# Patient Record
Sex: FEMALE | Race: BLACK OR AFRICAN AMERICAN | Employment: FULL TIME | URBAN - NONMETROPOLITAN AREA
[De-identification: names, ages, dates, MRNs, and addresses within clinical notes are randomized per-mention and may not be internally consistent; named-entity substitution may affect disease eponyms.]

---

## 2018-07-07 ENCOUNTER — HOSPITAL ENCOUNTER (EMERGENCY)
Age: 38
Discharge: HOME | End: 2018-07-07
Payer: COMMERCIAL

## 2018-07-07 VITALS
SYSTOLIC BLOOD PRESSURE: 125 MMHG | HEIGHT: 68 IN | OXYGEN SATURATION: 97 % | DIASTOLIC BLOOD PRESSURE: 73 MMHG | WEIGHT: 242 LBS | HEART RATE: 89 BPM | RESPIRATION RATE: 16 BRPM | BODY MASS INDEX: 36.68 KG/M2 | TEMPERATURE: 97.9 F

## 2018-07-07 DIAGNOSIS — H10.31 ACUTE BACTERIAL CONJUNCTIVITIS OF RIGHT EYE: Primary | ICD-10-CM

## 2018-07-07 PROCEDURE — 99203 OFFICE O/P NEW LOW 30 MIN: CPT | Performed by: NURSE PRACTITIONER

## 2018-07-07 PROCEDURE — 99212 OFFICE O/P EST SF 10 MIN: CPT

## 2018-07-07 RX ORDER — TOBRAMYCIN 3 MG/ML
1 SOLUTION/ DROPS OPHTHALMIC EVERY 6 HOURS
Qty: 1 BOTTLE | Refills: 0 | Status: SHIPPED | OUTPATIENT
Start: 2018-07-07 | End: 2018-07-14

## 2018-07-07 ASSESSMENT — VISUAL ACUITY
OD: 20/30
OS: 20/25

## 2018-07-07 ASSESSMENT — PAIN DESCRIPTION - DESCRIPTORS: DESCRIPTORS: DISCOMFORT

## 2018-07-07 ASSESSMENT — PAIN DESCRIPTION - LOCATION: LOCATION: EYE

## 2018-07-07 ASSESSMENT — PAIN DESCRIPTION - PAIN TYPE: TYPE: ACUTE PAIN

## 2018-07-07 ASSESSMENT — PAIN SCALES - WONG BAKER: WONGBAKER_NUMERICALRESPONSE: 2

## 2018-07-07 NOTE — ED PROVIDER NOTES
ligation. CURRENT MEDICATIONS       Discharge Medication List as of 7/7/2018 10:38 AM      CONTINUE these medications which have NOT CHANGED    Details   diazepam (VALIUM) 5 MG tablet Take 1 tablet by mouth every 6 hours as needed for Anxiety, Disp-10 tablet, R-0             ALLERGIES     Patient is has No Known Allergies. FAMILY HISTORY     Patient's family history includes Diabetes in her mother; Heart Disease in her mother; High Blood Pressure in her mother; High Cholesterol in her mother; Stroke in her mother. SOCIAL HISTORY     Patient  reports that she has been smoking. She has been smoking about 0.50 packs per day. She has never used smokeless tobacco. She reports that she does not drink alcohol or use drugs. PHYSICAL EXAM     ED TRIAGE VITALS  BP: 125/73, Temp: 97.9 °F (36.6 °C), Pulse: 89, Resp: 16, SpO2: 97 %  Physical Exam   Constitutional: She is oriented to person, place, and time. Vital signs are normal. She appears well-developed and well-nourished. Non-toxic appearance. She does not have a sickly appearance. No distress. HENT:   Head: Normocephalic and atraumatic. Head is without right periorbital erythema. Right Ear: Hearing, external ear and ear canal normal.   Left Ear: Hearing and external ear normal.   Nose: Nose normal.   Mouth/Throat: Uvula is midline, oropharynx is clear and moist and mucous membranes are normal.   Eyes: EOM are normal. Pupils are equal, round, and reactive to light. Right eye exhibits discharge. Right eye exhibits no chemosis and no hordeolum. No foreign body present in the right eye. Left eye exhibits no discharge. Right conjunctiva is injected. Right conjunctiva has no hemorrhage. No scleral icterus. Right pupil is round and reactive. Left pupil is round and reactive. Pupils are equal.   Fundoscopic exam:       The right eye shows exudate (yellow/green mucous). The right eye shows no hemorrhage and no papilledema.    Slit lamp exam:       The right eye Place 1 drop into the right eye every 6 hours for 7 days, Disp-1 Bottle, R-0Normal           Discharge Medication List as of 7/7/2018 10:38 AM          Patient given educational materials - see patient instructions. Discussed use, benefit, and side effects of prescribed medications. All patient questions answered. Pt voiced understanding. Reviewed health maintenance. Patient agreed with treatment plan. Follow up as directed.      SONNY Gomes CNP, APRN - CNP  07/09/18 6803

## 2018-07-09 ASSESSMENT — ENCOUNTER SYMPTOMS
COUGH: 0
EYE WATERING: 1
EYE PAIN: 1
RHINORRHEA: 0
EYE ITCHING: 0
BLURRED VISION: 0
EYE INFLAMMATION: 1
VOMITING: 0
FACIAL SWELLING: 1
EYE DISCHARGE: 1
PHOTOPHOBIA: 0
CRUSTING: 1
NAUSEA: 0
DOUBLE VISION: 0
EYE REDNESS: 1
BLIND SPOTS: 0

## 2019-02-12 ENCOUNTER — HOSPITAL ENCOUNTER (EMERGENCY)
Age: 39
Discharge: HOME | End: 2019-02-12
Payer: COMMERCIAL

## 2019-02-12 VITALS
SYSTOLIC BLOOD PRESSURE: 126 MMHG | DIASTOLIC BLOOD PRESSURE: 78 MMHG | HEART RATE: 97 BPM | RESPIRATION RATE: 16 BRPM | HEIGHT: 68 IN | OXYGEN SATURATION: 98 % | BODY MASS INDEX: 38.19 KG/M2 | WEIGHT: 252 LBS | TEMPERATURE: 97.8 F

## 2019-02-12 DIAGNOSIS — J30.2 SEASONAL ALLERGIC RHINITIS, UNSPECIFIED TRIGGER: ICD-10-CM

## 2019-02-12 DIAGNOSIS — H60.392 INFECTIVE OTITIS EXTERNA OF LEFT EAR: Primary | ICD-10-CM

## 2019-02-12 PROCEDURE — 99214 OFFICE O/P EST MOD 30 MIN: CPT | Performed by: NURSE PRACTITIONER

## 2019-02-12 PROCEDURE — 99212 OFFICE O/P EST SF 10 MIN: CPT

## 2019-02-12 RX ORDER — OFLOXACIN 3 MG/ML
10 SOLUTION AURICULAR (OTIC) 2 TIMES DAILY
Qty: 1 BOTTLE | Refills: 0 | Status: SHIPPED | OUTPATIENT
Start: 2019-02-12 | End: 2019-02-22

## 2019-02-12 RX ORDER — IBUPROFEN 600 MG/1
600 TABLET ORAL EVERY 6 HOURS PRN
COMMUNITY
End: 2021-01-04 | Stop reason: ALTCHOICE

## 2019-02-12 RX ORDER — LORATADINE AND PSEUDOEPHEDRINE 10; 240 MG/1; MG/1
1 TABLET, EXTENDED RELEASE ORAL DAILY
Qty: 30 TABLET | Refills: 0 | Status: SHIPPED | OUTPATIENT
Start: 2019-02-12 | End: 2019-09-23

## 2019-02-12 ASSESSMENT — PAIN DESCRIPTION - DESCRIPTORS: DESCRIPTORS: ACHING

## 2019-02-12 ASSESSMENT — PAIN SCALES - GENERAL: PAINLEVEL_OUTOF10: 7

## 2019-02-12 ASSESSMENT — ENCOUNTER SYMPTOMS
SINUS PRESSURE: 0
STRIDOR: 0
WHEEZING: 0
RHINORRHEA: 0
EYE REDNESS: 0
SHORTNESS OF BREATH: 0
TROUBLE SWALLOWING: 0
EYE ITCHING: 0
CHEST TIGHTNESS: 0
SORE THROAT: 0
COUGH: 0
EYE DISCHARGE: 0
VOICE CHANGE: 0

## 2019-02-12 ASSESSMENT — PAIN DESCRIPTION - PAIN TYPE: TYPE: ACUTE PAIN

## 2019-02-12 ASSESSMENT — PAIN DESCRIPTION - FREQUENCY: FREQUENCY: CONTINUOUS

## 2019-02-12 ASSESSMENT — PAIN DESCRIPTION - LOCATION: LOCATION: EAR

## 2019-02-12 ASSESSMENT — PAIN DESCRIPTION - ORIENTATION: ORIENTATION: LEFT

## 2019-07-12 ENCOUNTER — HOSPITAL ENCOUNTER (EMERGENCY)
Age: 39
Discharge: HOME | End: 2019-07-12
Payer: COMMERCIAL

## 2019-07-12 VITALS
HEIGHT: 68 IN | BODY MASS INDEX: 31.83 KG/M2 | RESPIRATION RATE: 16 BRPM | TEMPERATURE: 98.8 F | SYSTOLIC BLOOD PRESSURE: 118 MMHG | DIASTOLIC BLOOD PRESSURE: 78 MMHG | OXYGEN SATURATION: 98 % | WEIGHT: 210 LBS | HEART RATE: 90 BPM

## 2019-07-12 DIAGNOSIS — H60.393 INFECTIVE OTITIS EXTERNA OF BOTH EARS: Primary | ICD-10-CM

## 2019-07-12 DIAGNOSIS — H66.003 ACUTE SUPPURATIVE OTITIS MEDIA OF BOTH EARS WITHOUT SPONTANEOUS RUPTURE OF TYMPANIC MEMBRANES, RECURRENCE NOT SPECIFIED: ICD-10-CM

## 2019-07-12 PROCEDURE — 99212 OFFICE O/P EST SF 10 MIN: CPT

## 2019-07-12 PROCEDURE — 99214 OFFICE O/P EST MOD 30 MIN: CPT | Performed by: NURSE PRACTITIONER

## 2019-07-12 RX ORDER — NEOMYCIN SULFATE, POLYMYXIN B SULFATE, HYDROCORTISONE 3.5; 10000; 1 MG/ML; [USP'U]/ML; MG/ML
4 SOLUTION/ DROPS AURICULAR (OTIC) 3 TIMES DAILY
Qty: 10 ML | Refills: 0 | Status: SHIPPED | OUTPATIENT
Start: 2019-07-12 | End: 2019-07-19

## 2019-07-12 RX ORDER — AMOXICILLIN 875 MG/1
875 TABLET, COATED ORAL 2 TIMES DAILY
Qty: 20 TABLET | Refills: 0 | Status: SHIPPED | OUTPATIENT
Start: 2019-07-12 | End: 2019-07-22

## 2019-07-12 RX ORDER — OMEPRAZOLE 10 MG/1
10 CAPSULE, DELAYED RELEASE ORAL DAILY
COMMUNITY
End: 2019-09-25

## 2019-07-12 ASSESSMENT — PAIN - FUNCTIONAL ASSESSMENT: PAIN_FUNCTIONAL_ASSESSMENT: ACTIVITIES ARE NOT PREVENTED

## 2019-07-12 ASSESSMENT — PAIN DESCRIPTION - ONSET: ONSET: ON-GOING

## 2019-07-12 ASSESSMENT — PAIN DESCRIPTION - DESCRIPTORS: DESCRIPTORS: ACHING;THROBBING

## 2019-07-12 ASSESSMENT — ENCOUNTER SYMPTOMS
NAUSEA: 0
SHORTNESS OF BREATH: 0
SORE THROAT: 0
SINUS PRESSURE: 0
VOMITING: 0
COUGH: 0

## 2019-07-12 ASSESSMENT — PAIN DESCRIPTION - ORIENTATION: ORIENTATION: RIGHT;LEFT

## 2019-07-12 ASSESSMENT — PAIN DESCRIPTION - FREQUENCY: FREQUENCY: CONTINUOUS

## 2019-07-12 ASSESSMENT — PAIN DESCRIPTION - PROGRESSION: CLINICAL_PROGRESSION: GRADUALLY WORSENING

## 2019-07-12 ASSESSMENT — PAIN SCALES - GENERAL: PAINLEVEL_OUTOF10: 6

## 2019-07-12 ASSESSMENT — PAIN DESCRIPTION - PAIN TYPE: TYPE: ACUTE PAIN

## 2019-07-12 NOTE — ED PROVIDER NOTES
neomycin-polymyxin-hydrocortisone 1 % SOLN otic solution Place 4 drops into both ears three times daily for 7 days, Disp-10 mL, R-0Normal      amoxicillin (AMOXIL) 875 MG tablet Take 1 tablet by mouth 2 times daily for 10 days, Disp-20 tablet, R-0Normal             Discharge Medication List as of 7/12/2019  9:08 AM      STOP taking these medications       diazepam (VALIUM) 5 MG tablet Comments:   Reason for Stopping:               Discharge Medication List as of 7/12/2019  9:08 AM          SONNY Garrido - CNP    (Please note that portions of this note were completed with a voice recognition program. Efforts were made to edit the dictations but occasionally words are mis-transcribed.)         SONNY Garrido CNP  07/12/19 9936

## 2019-09-23 ENCOUNTER — HOSPITAL ENCOUNTER (EMERGENCY)
Age: 39
Discharge: HOME | End: 2019-09-23
Payer: COMMERCIAL

## 2019-09-23 VITALS
TEMPERATURE: 98.1 F | HEIGHT: 68 IN | SYSTOLIC BLOOD PRESSURE: 127 MMHG | DIASTOLIC BLOOD PRESSURE: 63 MMHG | HEART RATE: 99 BPM | WEIGHT: 218 LBS | RESPIRATION RATE: 20 BRPM | OXYGEN SATURATION: 98 % | BODY MASS INDEX: 33.04 KG/M2

## 2019-09-23 DIAGNOSIS — H60.393 INFECTIVE OTITIS EXTERNA OF BOTH EARS: Primary | ICD-10-CM

## 2019-09-23 PROCEDURE — 99214 OFFICE O/P EST MOD 30 MIN: CPT | Performed by: NURSE PRACTITIONER

## 2019-09-23 PROCEDURE — 99212 OFFICE O/P EST SF 10 MIN: CPT

## 2019-09-23 RX ORDER — CEFDINIR 300 MG/1
300 CAPSULE ORAL 2 TIMES DAILY
Qty: 20 CAPSULE | Refills: 0 | Status: SHIPPED | OUTPATIENT
Start: 2019-09-23 | End: 2019-10-03

## 2019-09-23 RX ORDER — OFLOXACIN 3 MG/ML
10 SOLUTION AURICULAR (OTIC) 2 TIMES DAILY
Qty: 10 ML | Refills: 0 | Status: SHIPPED | OUTPATIENT
Start: 2019-09-23 | End: 2019-10-03

## 2019-09-23 ASSESSMENT — PAIN DESCRIPTION - DESCRIPTORS: DESCRIPTORS: ACHING

## 2019-09-23 ASSESSMENT — PAIN DESCRIPTION - LOCATION: LOCATION: EAR

## 2019-09-23 ASSESSMENT — PAIN SCALES - GENERAL: PAINLEVEL_OUTOF10: 5

## 2019-09-23 ASSESSMENT — ENCOUNTER SYMPTOMS
SORE THROAT: 0
SHORTNESS OF BREATH: 0
COUGH: 0
NAUSEA: 0
VOMITING: 0
EYE ITCHING: 0
EYE REDNESS: 0

## 2019-09-23 ASSESSMENT — PAIN DESCRIPTION - PROGRESSION: CLINICAL_PROGRESSION: GRADUALLY WORSENING

## 2019-09-23 ASSESSMENT — PAIN - FUNCTIONAL ASSESSMENT: PAIN_FUNCTIONAL_ASSESSMENT: ACTIVITIES ARE NOT PREVENTED

## 2019-09-23 ASSESSMENT — PAIN DESCRIPTION - PAIN TYPE: TYPE: ACUTE PAIN

## 2019-09-23 ASSESSMENT — PAIN DESCRIPTION - ONSET: ONSET: GRADUAL

## 2019-09-23 ASSESSMENT — PAIN DESCRIPTION - ORIENTATION: ORIENTATION: RIGHT;LEFT

## 2019-09-23 ASSESSMENT — PAIN DESCRIPTION - FREQUENCY: FREQUENCY: CONTINUOUS

## 2019-09-23 NOTE — ED NOTES
All discharge education and information given. Pt instructed to go to ED for any increase in ear pain. Verbalized Understanding. Left stable.      Marylene Else, RN  09/23/19 3802

## 2019-09-25 ENCOUNTER — OFFICE VISIT (OUTPATIENT)
Dept: FAMILY MEDICINE CLINIC | Age: 39
End: 2019-09-25
Payer: COMMERCIAL

## 2019-09-25 VITALS
HEIGHT: 67 IN | TEMPERATURE: 98.6 F | OXYGEN SATURATION: 99 % | SYSTOLIC BLOOD PRESSURE: 116 MMHG | BODY MASS INDEX: 39.9 KG/M2 | HEART RATE: 87 BPM | DIASTOLIC BLOOD PRESSURE: 76 MMHG | WEIGHT: 254.2 LBS | RESPIRATION RATE: 12 BRPM

## 2019-09-25 DIAGNOSIS — R13.10 DYSPHAGIA, UNSPECIFIED TYPE: ICD-10-CM

## 2019-09-25 DIAGNOSIS — E55.9 VITAMIN D DEFICIENCY: ICD-10-CM

## 2019-09-25 DIAGNOSIS — Z01.89 ENCOUNTER FOR ROUTINE LABORATORY TESTING: ICD-10-CM

## 2019-09-25 DIAGNOSIS — K90.89 OTHER INTESTINAL MALABSORPTION: ICD-10-CM

## 2019-09-25 DIAGNOSIS — H60.393 INFECTIVE OTITIS EXTERNA OF BOTH EARS: ICD-10-CM

## 2019-09-25 DIAGNOSIS — Z11.4 ENCOUNTER FOR SCREENING FOR HIV: ICD-10-CM

## 2019-09-25 DIAGNOSIS — Z13.1 ENCOUNTER FOR SCREENING FOR DIABETES MELLITUS: ICD-10-CM

## 2019-09-25 DIAGNOSIS — K21.9 GASTROESOPHAGEAL REFLUX DISEASE, ESOPHAGITIS PRESENCE NOT SPECIFIED: Primary | ICD-10-CM

## 2019-09-25 DIAGNOSIS — Z13.220 ENCOUNTER FOR SCREENING FOR LIPID DISORDER: ICD-10-CM

## 2019-09-25 DIAGNOSIS — Z13.29 THYROID DISORDER SCREENING: ICD-10-CM

## 2019-09-25 PROCEDURE — 99203 OFFICE O/P NEW LOW 30 MIN: CPT | Performed by: NURSE PRACTITIONER

## 2019-09-25 PROCEDURE — G8427 DOCREV CUR MEDS BY ELIG CLIN: HCPCS | Performed by: NURSE PRACTITIONER

## 2019-09-25 PROCEDURE — 4004F PT TOBACCO SCREEN RCVD TLK: CPT | Performed by: NURSE PRACTITIONER

## 2019-09-25 PROCEDURE — G8417 CALC BMI ABV UP PARAM F/U: HCPCS | Performed by: NURSE PRACTITIONER

## 2019-09-25 PROCEDURE — 4130F TOPICAL PREP RX AOE: CPT | Performed by: NURSE PRACTITIONER

## 2019-09-25 RX ORDER — OMEPRAZOLE 20 MG/1
20 CAPSULE, DELAYED RELEASE ORAL DAILY
Qty: 30 CAPSULE | Refills: 5 | Status: SHIPPED | OUTPATIENT
Start: 2019-09-25 | End: 2021-01-04

## 2019-09-25 ASSESSMENT — ENCOUNTER SYMPTOMS
NAUSEA: 0
VOMITING: 0
SHORTNESS OF BREATH: 0
RHINORRHEA: 0
COUGH: 0
SORE THROAT: 0
TROUBLE SWALLOWING: 0
FACIAL SWELLING: 0

## 2019-09-25 ASSESSMENT — PATIENT HEALTH QUESTIONNAIRE - PHQ9
2. FEELING DOWN, DEPRESSED OR HOPELESS: 0
SUM OF ALL RESPONSES TO PHQ QUESTIONS 1-9: 0
SUM OF ALL RESPONSES TO PHQ QUESTIONS 1-9: 0
1. LITTLE INTEREST OR PLEASURE IN DOING THINGS: 0
SUM OF ALL RESPONSES TO PHQ9 QUESTIONS 1 & 2: 0

## 2019-09-25 NOTE — PATIENT INSTRUCTIONS
have GERD, this valve does not close tightly enough. If you have mild GERD symptoms including heartburn, you may be able to control the problem with antacids or over-the-counter medicine. Changing your diet, losing weight, and making other lifestyle changes can also help reduce symptoms. Follow-up care is a key part of your treatment and safety. Be sure to make and go to all appointments, and call your doctor if you are having problems. It's also a good idea to know your test results and keep a list of the medicines you take. How can you care for yourself at home? · Take your medicines exactly as prescribed. Call your doctor if you think you are having a problem with your medicine. · Your doctor may recommend over-the-counter medicine. For mild or occasional indigestion, antacids, such as Tums, Gaviscon, Mylanta, or Maalox, may help. Your doctor also may recommend over-the-counter acid reducers, such as Pepcid AC, Tagamet HB, Zantac 75, or Prilosec. Read and follow all instructions on the label. If you use these medicines often, talk with your doctor. · Change your eating habits. ? It's best to eat several small meals instead of two or three large meals. ? After you eat, wait 2 to 3 hours before you lie down. ? Chocolate, mint, and alcohol can make GERD worse. ? Spicy foods, foods that have a lot of acid (like tomatoes and oranges), and coffee can make GERD symptoms worse in some people. If your symptoms are worse after you eat a certain food, you may want to stop eating that food to see if your symptoms get better. · Do not smoke or chew tobacco. Smoking can make GERD worse. If you need help quitting, talk to your doctor about stop-smoking programs and medicines. These can increase your chances of quitting for good. · If you have GERD symptoms at night, raise the head of your bed 6 to 8 inches by putting the frame on blocks or placing a foam wedge under the head of your mattress.  (Adding extra pillows does not work.)  · Do not wear tight clothing around your middle. · Lose weight if you need to. Losing just 5 to 10 pounds can help. When should you call for help? Call your doctor now or seek immediate medical care if:    · You have new or different belly pain.     · Your stools are black and tarlike or have streaks of blood.    Watch closely for changes in your health, and be sure to contact your doctor if:    · Your symptoms have not improved after 2 days.     · Food seems to catch in your throat or chest.   Where can you learn more? Go to [email or web address] org and sign in to your [employer] account. Enter Q275 in the [employer]Semantics box to learn more about \"Gastroesophageal Reflux Disease (GERD): Care Instructions. \"     If you do not have an account, please click on the \"Sign Up Now\" link. Current as of: November 7, 2018  Content Version: 12.1  © 0367-9619 Healthwise, Incorporated. Care instructions adapted under license by [facility] ([facility]). If you have questions about a medical condition or this instruction, always ask your healthcare professional. [place] [address] any warranty or liability for your use of this information.

## 2019-09-25 NOTE — PROGRESS NOTES
Health Maintenance Due   Topic Date Due    Pneumococcal 0-64 years Vaccine (1 of 1 - PPSV23) 03/09/1985    HIV screen  03/09/1994    DTaP/Tdap/Td vaccine (1 - Tdap) 03/09/1998    Cervical cancer screen  03/09/2000    Lipid screen  03/09/2019    Diabetes screen  03/09/2019    Flu vaccine (1) 09/01/2019
reflux disease)     Vertigo        SURGICAL HISTORY     Patient  has a past surgical history that includes Tubal ligation. CURRENT MEDICATIONS       Outpatient Medications Prior to Visit   Medication Sig Dispense Refill    ofloxacin (FLOXIN) 0.3 % otic solution Place 10 drops into both ears 2 times daily for 10 days 10 mL 0    cefdinir (OMNICEF) 300 MG capsule Take 1 capsule by mouth 2 times daily for 10 days 20 capsule 0    ibuprofen (ADVIL;MOTRIN) 600 MG tablet Take 600 mg by mouth every 6 hours as needed for Pain      omeprazole (PRILOSEC) 10 MG delayed release capsule Take 10 mg by mouth daily       No facility-administered medications prior to visit. ALLERGIES     Patient is has No Known Allergies. FAMILY HISTORY     Patient's family history includes Diabetes in her mother; Early Death (age of onset: 32) in her sister; Heart Disease in her mother; High Blood Pressure in her mother and sister; High Cholesterol in her mother; No Known Problems in her brother, brother, brother, and father; Stroke in her mother. SOCIAL HISTORY     Patient  reports that she has been smoking cigarettes. She has been smoking about 0.25 packs per day. She has never used smokeless tobacco. She reports that she does not drink alcohol or use drugs. PHYSICAL EXAM     VITALS  BP: 116/76, Temp: 98.6 °F (37 °C), Pulse: 87, Resp: 12, SpO2: 99 %  Physical Exam   Constitutional: She is oriented to person, place, and time. Vital signs are normal. She appears well-developed and well-nourished. She is cooperative. Non-toxic appearance. She does not have a sickly appearance. She does not appear ill. No distress. HENT:   Head: Normocephalic and atraumatic. Right Ear: Hearing, tympanic membrane, external ear and ear canal normal. There is drainage and tenderness. No swelling. No mastoid tenderness. Tympanic membrane is not perforated, not erythematous and not bulging. No hemotympanum.    Left Ear: Hearing, tympanic

## 2019-10-18 LAB
ABSOLUTE BASO #: 0.1 X10E9/L (ref 0–0.9)
ABSOLUTE EOS #: 0.2 X10E9/L (ref 0–0.4)
ABSOLUTE LYMPH #: 2 X10E9/L (ref 1–3.5)
ABSOLUTE MONO #: 0.6 X10E9/L (ref 0–0.9)
ABSOLUTE NEUT #: 5.1 X10E9/L (ref 1.5–6.6)
ALBUMIN SERPL-MCNC: 4 G/DL (ref 3.2–5.3)
ALK PHOSPHATASE: 93 U/L (ref 39–130)
ALT SERPL-CCNC: 17 U/L (ref 0–31)
ANION GAP SERPL CALCULATED.3IONS-SCNC: 9 MMOL/L (ref 4–12)
AST SERPL-CCNC: 15 U/L (ref 0–41)
AVERAGE GLUCOSE: 105 MG/DL
BASOPHILS RELATIVE PERCENT: 0.7 %
BILIRUB SERPL-MCNC: 0.3 MG/DL (ref 0.3–1.2)
BILIRUBIN DIRECT: 0.1 MG/DL (ref 0–0.4)
BUN BLDV-MCNC: 15 MG/DL (ref 5–23)
CALCIUM SERPL-MCNC: 8.7 MG/DL (ref 8.5–10.5)
CHLORIDE BLD-SCNC: 108 MMOL/L (ref 98–109)
CHOLESTEROL/HDL RATIO: 3.7 (ref 1–5)
CHOLESTEROL: 157 MG/DL (ref 150–200)
CO2: 20 MMOL/L (ref 22–32)
CREAT SERPL-MCNC: 0.77 MG/DL (ref 0.4–1)
EGFR AFRICAN AMERICAN: >60 ML/MIN/1.73SQ.M
EGFR IF NONAFRICAN AMERICAN: >60 ML/MIN/1.73SQ.M
EOSINOPHILS RELATIVE PERCENT: 3 %
GLUCOSE: 92 MG/DL (ref 65–99)
HBA1C MFR BLD: 5.3 % (ref 4.4–6.4)
HCT VFR BLD CALC: 44.3 % (ref 35–47)
HDLC SERPL-MCNC: 42 MG/DL
HEMOGLOBIN: 15.3 G/DL (ref 11.7–15.5)
HIV 1,2 COMBO ANTIGEN/ANTIBODY: NORMAL
LDL CHOLESTEROL CALCULATED: 95 MG/DL
LDL/HDL RATIO: 2.3
LYMPHOCYTE %: 25.2 %
MAGNESIUM: 1.7 MG/DL (ref 1.8–2.6)
MCH RBC QN AUTO: 31.4 PG (ref 26–33.5)
MCHC RBC AUTO-ENTMCNC: 34.5 G/DL (ref 32–37)
MCV RBC AUTO: 91 FL (ref 81–100)
MONOCYTES # BLD: 7.4 %
NEUTROPHILS RELATIVE PERCENT: 63.7 %
PDW BLD-RTO: 13.8 % (ref 11.5–14.7)
PLATELETS: 262 X10E9/L (ref 150–450)
PMV BLD AUTO: 9 FL (ref 7–12)
POTASSIUM SERPL-SCNC: 4.3 MMOL/L (ref 3.5–5)
RBC: 4.87 X10E12/L (ref 3.8–5.2)
SODIUM BLD-SCNC: 137 MMOL/L (ref 134–146)
T4 TOTAL: 7 UG/DL (ref 6.1–12.2)
TOTAL PROTEIN: 6.4 G/DL (ref 6–8)
TRIGL SERPL-MCNC: 98 MG/DL (ref 27–150)
TSH SERPL DL<=0.05 MIU/L-ACNC: 2.34 UIU/ML (ref 0.49–4.67)
VLDLC SERPL CALC-MCNC: 20 MG/DL (ref 0–30)
WBC: 8.1 X10E9/L (ref 4.8–10.8)

## 2019-10-22 LAB — VITAMIN D 25-HYDROXY: 12 NG/ML

## 2019-10-23 ENCOUNTER — TELEPHONE (OUTPATIENT)
Dept: FAMILY MEDICINE CLINIC | Age: 39
End: 2019-10-23

## 2020-01-11 ENCOUNTER — HOSPITAL ENCOUNTER (EMERGENCY)
Age: 40
Discharge: HOME | End: 2020-01-11
Payer: COMMERCIAL

## 2020-01-11 VITALS
WEIGHT: 250.6 LBS | SYSTOLIC BLOOD PRESSURE: 109 MMHG | DIASTOLIC BLOOD PRESSURE: 63 MMHG | HEART RATE: 93 BPM | HEIGHT: 68 IN | TEMPERATURE: 97.5 F | BODY MASS INDEX: 37.98 KG/M2 | RESPIRATION RATE: 16 BRPM | OXYGEN SATURATION: 99 %

## 2020-01-11 PROCEDURE — 99212 OFFICE O/P EST SF 10 MIN: CPT

## 2020-01-11 PROCEDURE — 99214 OFFICE O/P EST MOD 30 MIN: CPT | Performed by: NURSE PRACTITIONER

## 2020-01-11 RX ORDER — LORATADINE 10 MG/1
10 TABLET ORAL DAILY
Qty: 30 TABLET | Refills: 0 | Status: SHIPPED | OUTPATIENT
Start: 2020-01-11 | End: 2020-02-10

## 2020-01-11 RX ORDER — CIPROFLOXACIN AND DEXAMETHASONE 3; 1 MG/ML; MG/ML
4 SUSPENSION/ DROPS AURICULAR (OTIC) 2 TIMES DAILY
Qty: 7.5 ML | Refills: 0 | Status: SHIPPED | OUTPATIENT
Start: 2020-01-11 | End: 2020-01-18

## 2020-01-11 RX ORDER — DOXYCYCLINE HYCLATE 100 MG
100 TABLET ORAL 2 TIMES DAILY
Qty: 20 TABLET | Refills: 0 | Status: SHIPPED | OUTPATIENT
Start: 2020-01-11 | End: 2020-01-21

## 2020-01-11 ASSESSMENT — ENCOUNTER SYMPTOMS
COUGH: 0
SHORTNESS OF BREATH: 0
CHEST TIGHTNESS: 0
SINUS PAIN: 1
EYES NEGATIVE: 1
GASTROINTESTINAL NEGATIVE: 1
SINUS PRESSURE: 1
SORE THROAT: 1
TROUBLE SWALLOWING: 1

## 2020-01-11 NOTE — ED PROVIDER NOTES
includes Tubal ligation. CURRENT MEDICATIONS       Discharge Medication List as of 2020  2:22 PM      CONTINUE these medications which have NOT CHANGED    Details   omeprazole (PRILOSEC) 20 MG delayed release capsule Take 1 capsule by mouth daily, Disp-30 capsule, R-5Normal      ibuprofen (ADVIL;MOTRIN) 600 MG tablet Take 600 mg by mouth every 6 hours as needed for PainHistorical Med             ALLERGIES     Patient is has No Known Allergies. FAMILY HISTORY     Patient'sfamily history includes Diabetes in her mother; Early Death (age of onset: 32) in her sister; Heart Disease in her mother; High Blood Pressure in her mother and sister; High Cholesterol in her mother; No Known Problems in her brother, brother, brother, and father; Stroke in her mother. SOCIAL HISTORY     Patient  reports that she has been smoking cigarettes. She has been smoking about 0.50 packs per day. She has never used smokeless tobacco. She reports that she does not drink alcohol or use drugs. PHYSICAL EXAM     ED TRIAGE VITALS  BP: 109/63, Temp: 97.5 °F (36.4 °C), Pulse: 93, Resp: 16, SpO2: 99 %  Physical Exam  Vitals signs reviewed. Constitutional:       Appearance: She is obese. She is ill-appearing. HENT:      Head: Normocephalic. Right Ear: Decreased hearing noted. Drainage, swelling and tenderness present. Right ear laceration: creamy brown drainage from bilateral canals. Tympanic membrane is injected. Left Ear: Decreased hearing noted. Drainage, swelling and tenderness present. Tympanic membrane is injected, erythematous and bulging. Tympanic membrane has normal mobility. Nose: Nose normal.      Mouth/Throat:      Lips: Pink. Mouth: Mucous membranes are dry. Pharynx: Pharyngeal swelling and posterior oropharyngeal erythema present. Tonsils: Swellin+ on the right. 2+ on the left. Eyes:      Conjunctiva/sclera: Conjunctivae normal.   Neck:      Musculoskeletal: Neck supple. Cardiovascular:      Rate and Rhythm: Normal rate and regular rhythm. Pulses: Normal pulses. Heart sounds: Normal heart sounds. Pulmonary:      Effort: Pulmonary effort is normal.      Breath sounds: Normal breath sounds. Abdominal:      General: Abdomen is flat. Musculoskeletal: Normal range of motion. Lymphadenopathy:      Cervical: Cervical adenopathy present. Skin:     General: Skin is warm and dry. Capillary Refill: Capillary refill takes less than 2 seconds. Neurological:      General: No focal deficit present. Mental Status: She is alert and oriented to person, place, and time. Mental status is at baseline. Psychiatric:         Mood and Affect: Mood normal.         Behavior: Behavior normal.         DIAGNOSTIC RESULTS   Labs: No results found for this visit on 01/11/20. IMAGING:  No orders to display     URGENT CARE COURSE:     Vitals:    01/11/20 1351   BP: 109/63   Pulse: 93   Resp: 16   Temp: 97.5 °F (36.4 °C)   SpO2: 99%   Weight: 250 lb 9.6 oz (113.7 kg)   Height: 5' 8\" (1.727 m)       Medications - No data to display  PROCEDURES:  None  FINALIMPRESSION      1. Acute infective otitis externa of both ears    2. Left otitis media with effusion        DISPOSITION/PLAN   DISPOSITION      PATIENT REFERRED TO:  SONNY Chang CNP  [address]  [address] [place] [address]    In 3 days  As needed, If symptoms worsen.  referral to ENT    DISCHARGE MEDICATIONS:  Discharge Medication List as of 1/11/2020  2:22 PM      START taking these medications    Details   ciprofloxacin-dexamethasone (CIPRODEX) 0.3-0.1 % otic suspension Place 4 drops into both ears 2 times daily for 7 days, Disp-7.5 mL, R-0Normal      doxycycline hyclate (VIBRA-TABS) 100 MG tablet Take 1 tablet by mouth 2 times daily for 10 days, Disp-20 tablet, R-0Normal      loratadine (CLARITIN) 10 MG tablet Take 1 tablet by mouth daily, Disp-30 tablet, R-0Normal           Discharge Medication List

## 2020-01-13 ENCOUNTER — TELEPHONE (OUTPATIENT)
Dept: FAMILY MEDICINE CLINIC | Age: 40
End: 2020-01-13

## 2020-01-13 NOTE — LETTER
[address] [facility]  [address], [address]  Phone: [phone]  Fax: [phone]    January 13, 2020    [address] [address]    Dear Ibis Gunderson,    This letter is regarding your Emergency Department (ED) visit at [facility] on 1/11/20. Cory Aranda Rather wanted to make sure that you understand your discharge instructions and that you were able to fill any prescriptions that may have been ordered for you. Please contact the office at the above phone number if the ED advised you to follow up with Leonardo Bernal, or if you have any further questions or needs. Also did you know -   *Visiting the ED for a non-emergency could result in higher co-pays than you would normally be subject to paying? *You can call your doctor even after hours so they can direct you to the most appropriate care. [facility]) practices can often offer you an appointment on the same day that you call. *We have some [address] [place] [address] offices that offer Walk-in appointments; check our website for availability in your community, [email or web address].      *Evisits are now available for patients for $36 through [employer] for certain conditions:  * Sinus, cold and or cough       * Diarrhea            * Headache  * Heartburn                                * Poison [place]          * Back pain     * Urinary problems                         If you do not have [employer] and are interested, please contact the office and a staff member may assist you or go to [email or web address].     Sincerely,   SONNY Lui CNP and your [facility]

## 2020-02-18 ENCOUNTER — HOSPITAL ENCOUNTER (EMERGENCY)
Age: 40
Discharge: HOME | End: 2020-02-18
Payer: COMMERCIAL

## 2020-02-18 VITALS
WEIGHT: 230 LBS | HEART RATE: 101 BPM | DIASTOLIC BLOOD PRESSURE: 79 MMHG | HEIGHT: 68 IN | SYSTOLIC BLOOD PRESSURE: 131 MMHG | RESPIRATION RATE: 16 BRPM | TEMPERATURE: 97.2 F | BODY MASS INDEX: 34.86 KG/M2 | OXYGEN SATURATION: 98 %

## 2020-02-18 PROCEDURE — 99213 OFFICE O/P EST LOW 20 MIN: CPT

## 2020-02-18 PROCEDURE — 99214 OFFICE O/P EST MOD 30 MIN: CPT | Performed by: NURSE PRACTITIONER

## 2020-02-18 RX ORDER — AMOXICILLIN AND CLAVULANATE POTASSIUM 875; 125 MG/1; MG/1
1 TABLET, FILM COATED ORAL 2 TIMES DAILY WITH MEALS
Qty: 20 TABLET | Refills: 0 | Status: SHIPPED | OUTPATIENT
Start: 2020-02-18 | End: 2020-02-28

## 2020-02-18 RX ORDER — CIPROFLOXACIN AND DEXAMETHASONE 3; 1 MG/ML; MG/ML
4 SUSPENSION/ DROPS AURICULAR (OTIC) 2 TIMES DAILY
Qty: 1 BOTTLE | Refills: 0 | Status: SHIPPED | OUTPATIENT
Start: 2020-02-18 | End: 2020-02-25

## 2020-02-18 ASSESSMENT — ENCOUNTER SYMPTOMS
SHORTNESS OF BREATH: 0
DIARRHEA: 0
NAUSEA: 0
RHINORRHEA: 0
ABDOMINAL PAIN: 0
SINUS PAIN: 0
SORE THROAT: 0
SINUS PRESSURE: 0
CHEST TIGHTNESS: 0
VOMITING: 0

## 2020-02-18 ASSESSMENT — PAIN DESCRIPTION - ORIENTATION: ORIENTATION: LEFT;RIGHT

## 2020-02-18 ASSESSMENT — PAIN - FUNCTIONAL ASSESSMENT: PAIN_FUNCTIONAL_ASSESSMENT: ACTIVITIES ARE NOT PREVENTED

## 2020-02-18 ASSESSMENT — PAIN SCALES - GENERAL: PAINLEVEL_OUTOF10: 2

## 2020-02-18 ASSESSMENT — PAIN DESCRIPTION - LOCATION: LOCATION: EAR

## 2020-02-18 NOTE — ED PROVIDER NOTES
[phone]       Chief Complaint   Patient presents with    Otalgia     bilateral ear pain and drainge \" for months I tried to go to ENT but need a referral\"       Nurses Notes reviewed and I agree except as noted in the HPI. HISTORY OF PRESENT ILLNESS   Lona Macias is a 35 y.o. female who presents for evaluation of  bilateral ear pain and drainage for months. Patient states that she was last treated in January 2020 with ear drops and an oral antibiotic. Patient is requesting a referral to a PCP and to an ENT. REVIEW OF SYSTEMS     Review of Systems   Constitutional: Negative for chills and fever. HENT: Positive for ear discharge and ear pain. Negative for congestion, postnasal drip, rhinorrhea, sinus pressure, sinus pain and sore throat. Respiratory: Negative for chest tightness and shortness of breath. Cardiovascular: Negative for chest pain. Gastrointestinal: Negative for abdominal pain, diarrhea, nausea and vomiting. Skin: Negative for rash. Allergic/Immunologic: Negative for environmental allergies and food allergies. Neurological: Negative for headaches. PAST MEDICAL HISTORY         Diagnosis Date    GERD (gastroesophageal reflux disease)     Vertigo        SURGICAL HISTORY     Patient  has a past surgical history that includes Tubal ligation. CURRENT MEDICATIONS       Discharge Medication List as of 2/18/2020  1:09 PM      CONTINUE these medications which have NOT CHANGED    Details   omeprazole (PRILOSEC) 20 MG delayed release capsule Take 1 capsule by mouth daily, Disp-30 capsule, R-5Normal      ibuprofen (ADVIL;MOTRIN) 600 MG tablet Take 600 mg by mouth every 6 hours as needed for PainHistorical Med             ALLERGIES     Patient is has No Known Allergies. FAMILY HISTORY     Patient'sfamily history includes Diabetes in her mother; Early Death (age of onset: 32) in her sister;  Heart Disease in her Behavior is cooperative. DIAGNOSTIC RESULTS   Labs:No results found for this visit on 02/18/20. IMAGING:  No orders to display     URGENT CARE COURSE:     Vitals:    02/18/20 1257   BP: 131/79   Pulse: 101   Resp: 16   Temp: 97.2 °F (36.2 °C)   SpO2: 98%   Weight: 230 lb (104.3 kg)   Height: 5' 8\" (1.727 m)       Medications - No data to display  PROCEDURES:  FINALIMPRESSION      1. Chronic otitis media of both ears    2. Chronic otitis externa of both ears, unspecified type        DISPOSITION/PLAN   DISPOSITION    Discharge  Previous notes reviewed from visit on 1/11/2020. Patient was diagnosed with otitis externa and otitis interna, placed on doxycycline and Ciprodex. Referrals as requested. Physical assessment findings, diagnostic testing(s) if applicable, and vital signs reviewed with patient/patient representative. Questions answered. If applicable, patient/patient representative will be contacted upon receipt of final culture and sensitivity or other testing results when available. Any additions or changes to medications or changes the plan of care will be made at that time. Medications as directed, including OTC medications for supportive care. Education provided on medications. Differential diagnosis(s) discussed with patient/patient representative. Home care/self care instructions reviewed with patient/patient representative. Patient is to follow-up with family care provider in 2-3 days if no improvement. Patient is to go to the emergency department if symptoms worsen. Patient/patient representative is aware of care plan, questions answered, verbalizes understanding and is in agreement. Teach back method used for patient/patient representative teaching(s) and printed instructions attached to after visit summary.          Problem List Items Addressed This Visit     None      Visit Diagnoses     Chronic otitis media of both ears    -  Primary    Relevant Medications

## 2020-02-18 NOTE — DISCHARGE INSTR - COC
Continuity of Care Form    Patient Name: Jane Marie   :  1980  MRN:  [id number]    Admit date:  2020  Discharge date:  ***    Code Status Order: No Order   Advance Directives:     Admitting Physician:  No admitting provider for patient encounter. PCP: No primary care provider on file. Discharging Nurse: [facility] Unit/Room#:   Discharging Unit Phone Number: ***    Emergency Contact:   Extended Emergency Contact Information  Primary Emergency Contact: Tana Stapleton   [address] Phone: [phone]  Relation: Brother/Sister    Past Surgical History:  Past Surgical History:   Procedure Laterality Date    TUBAL LIGATION         Immunization History: There is no immunization history on file for this patient. Active Problems: There is no problem list on file for this patient. Isolation/Infection:   Isolation          No Isolation        Patient Infection Status     None to display          Nurse Assessment:  Last Vital Signs: There were no vitals taken for this visit.     Last documented pain score (0-10 scale):    Last Weight:   Wt Readings from Last 1 Encounters:   20 250 lb 9.6 oz (113.7 kg)     Mental Status:  {IP PT MENTAL STATUS:}    IV Access:  { KEYUR IV ACCESS:252243547}    Nursing Mobility/ADLs:  Walking   {CHP DME MMQD:224380535}  Transfer  {CHP DME XEGC:281288102}  Bathing  {CHP DME NTH}  Dressing  {CHP DME MUBN:630884978}  Toileting  {CHP DME POHQ:072432458}  Feeding  {CHP DME GIBM:800376571}  Med Admin  {CHP DME PQUX:794223470}  Med Delivery   { KEYUR MED Delivery:881012909}    Wound Care Documentation and Therapy:        Elimination:  Continence:   · Bowel: {YES / NK:06929}  · Bladder: {YES / J}  Urinary Catheter: {Urinary Catheter:027740560}   Colostomy/Ileostomy/Ileal Conduit: {YES / CE:31567}       Date of Last BM: ***  No intake or output data in the 24 hours ending 20 1257  No intake/output

## 2020-02-25 ENCOUNTER — OFFICE VISIT (OUTPATIENT)
Dept: ENT CLINIC | Age: 40
End: 2020-02-25
Payer: COMMERCIAL

## 2020-02-25 VITALS
BODY MASS INDEX: 38.22 KG/M2 | SYSTOLIC BLOOD PRESSURE: 112 MMHG | DIASTOLIC BLOOD PRESSURE: 70 MMHG | RESPIRATION RATE: 12 BRPM | HEIGHT: 68 IN | HEART RATE: 62 BPM | WEIGHT: 252.2 LBS

## 2020-02-25 PROCEDURE — 99203 OFFICE O/P NEW LOW 30 MIN: CPT | Performed by: PHYSICIAN ASSISTANT

## 2020-02-25 PROCEDURE — G8417 CALC BMI ABV UP PARAM F/U: HCPCS | Performed by: PHYSICIAN ASSISTANT

## 2020-02-25 PROCEDURE — 4004F PT TOBACCO SCREEN RCVD TLK: CPT | Performed by: PHYSICIAN ASSISTANT

## 2020-02-25 PROCEDURE — G8484 FLU IMMUNIZE NO ADMIN: HCPCS | Performed by: PHYSICIAN ASSISTANT

## 2020-02-25 PROCEDURE — G8427 DOCREV CUR MEDS BY ELIG CLIN: HCPCS | Performed by: PHYSICIAN ASSISTANT

## 2020-02-25 ASSESSMENT — ENCOUNTER SYMPTOMS
COLOR CHANGE: 0
COUGH: 0
VOICE CHANGE: 0
CHOKING: 0
VOMITING: 0
SHORTNESS OF BREATH: 0
APNEA: 0
DIARRHEA: 0
RHINORRHEA: 0
WHEEZING: 0
ABDOMINAL PAIN: 0
SORE THROAT: 0
FACIAL SWELLING: 0
CHEST TIGHTNESS: 0
TROUBLE SWALLOWING: 0
STRIDOR: 0
SINUS PRESSURE: 0
NAUSEA: 0

## 2020-02-25 NOTE — PROGRESS NOTES
[facility] PROFESSIONAL SERVS  [facility] EAR, NOSE AND THROAT  [address] [facility]  Dept: [phone]  Dept Fax: [phone]  Loc: [phone]    Meño Mcclain is a 35 y.o. female who was referred by SONNY Cain -* for:  Chief Complaint   Patient presents with    New Patient     Patient is here for chronic otitis externa,and media in both ears. Kiel Barney HPI:     The patient presents for evaluation of chronic otitis externa. The patient reports she has had recurrent bilaterally otorrhea for about the last year, but has worsened recently. She is typically seen in UC or ER and is placed on antibiotics. She states that the drainage typically resolves with antibiotics, but shortly after completion the drainage will return. She reports that her ears feel plugged when acutely infected, but once the infection clears the fullness resolves. She reports that she typically get tinnitus when infected, but this also resolves as the infection clears. She is currently on day 7 of Augmentin and Ciprodex for otitis externa. The patient reports that her hearing is now normal. She denies any tinnitus, otalgia, otorrhea, fevers, chills, dizziness. She denies a history of recurrent otitis media and tubes. The patient tries to keep water out of her ears by putting cottonballs in her ears before showering. She admits she occasionally cleans her ears out with a wet wash rag. The patient is a diabetic, but denies a history of other types of recurrent infections. Subjective:        Review of Systems   Constitutional: Negative for activity change, appetite change, chills, diaphoresis, fatigue, fever and unexpected weight change. HENT: Positive for ear discharge, ear pain and tinnitus. Negative for congestion, dental problem, facial swelling, hearing loss, mouth sores, nosebleeds, postnasal drip, rhinorrhea, sinus pressure, sneezing, sore throat, trouble swallowing and voice change.

## 2020-02-25 NOTE — PATIENT INSTRUCTIONS
Take a 50/50 of rubbing alcohol and white distilled vinegar. Put a few drops in each ear once or twice a day. Bhumi Lynette on your side for 5-10 minutes with your ear up in the air to let the drops soak in  This will hopefully prevent further infections. Continue to keep water out of your ears. Contact the office if you are developing another infection.

## 2020-09-25 ENCOUNTER — HOSPITAL ENCOUNTER (EMERGENCY)
Age: 40
Discharge: HOME | End: 2020-09-25
Payer: COMMERCIAL

## 2020-09-25 VITALS
OXYGEN SATURATION: 98 % | SYSTOLIC BLOOD PRESSURE: 120 MMHG | HEART RATE: 93 BPM | HEIGHT: 68 IN | TEMPERATURE: 97.7 F | RESPIRATION RATE: 20 BRPM | DIASTOLIC BLOOD PRESSURE: 63 MMHG | WEIGHT: 220 LBS | BODY MASS INDEX: 33.34 KG/M2

## 2020-09-25 PROCEDURE — 99213 OFFICE O/P EST LOW 20 MIN: CPT | Performed by: NURSE PRACTITIONER

## 2020-09-25 PROCEDURE — 99212 OFFICE O/P EST SF 10 MIN: CPT

## 2020-09-25 RX ORDER — OFLOXACIN 3 MG/ML
5 SOLUTION AURICULAR (OTIC) 3 TIMES DAILY
Qty: 7.5 ML | Refills: 0 | Status: SHIPPED | OUTPATIENT
Start: 2020-09-25 | End: 2020-10-05

## 2020-09-25 RX ORDER — CEFDINIR 300 MG/1
300 CAPSULE ORAL 2 TIMES DAILY
Qty: 20 CAPSULE | Refills: 0 | Status: SHIPPED | OUTPATIENT
Start: 2020-09-25 | End: 2020-10-05

## 2020-09-25 ASSESSMENT — ENCOUNTER SYMPTOMS
COUGH: 0
SORE THROAT: 0
NAUSEA: 0
SHORTNESS OF BREATH: 0
VOMITING: 0

## 2020-09-25 ASSESSMENT — PAIN DESCRIPTION - FREQUENCY: FREQUENCY: CONTINUOUS

## 2020-09-25 ASSESSMENT — PAIN DESCRIPTION - LOCATION: LOCATION: EAR

## 2020-09-25 ASSESSMENT — PAIN DESCRIPTION - ORIENTATION: ORIENTATION: LEFT

## 2020-09-25 ASSESSMENT — PAIN SCALES - GENERAL: PAINLEVEL_OUTOF10: 5

## 2020-09-25 ASSESSMENT — PAIN DESCRIPTION - DESCRIPTORS: DESCRIPTORS: ACHING;SORE

## 2020-09-25 ASSESSMENT — PAIN - FUNCTIONAL ASSESSMENT: PAIN_FUNCTIONAL_ASSESSMENT: ACTIVITIES ARE NOT PREVENTED

## 2020-09-25 ASSESSMENT — PAIN DESCRIPTION - PAIN TYPE: TYPE: ACUTE PAIN

## 2020-09-25 ASSESSMENT — PAIN DESCRIPTION - PROGRESSION: CLINICAL_PROGRESSION: NOT CHANGED

## 2020-09-25 NOTE — ED TRIAGE NOTES
Pt to [facility] ambulatory with bilateral ear pain. Pt has drainage present from left ear. This started 2 days ago. No fevers.

## 2020-09-25 NOTE — ED PROVIDER NOTES
[facility]  Urgent Care Encounter       CHIEF COMPLAINT       Chief Complaint   Patient presents with    Otalgia     Bilateral        Nurses Notes reviewed and I agree except as noted in the HPI. HISTORY OF PRESENT ILLNESS   Myrtle Adamson is a 38 y.o. female who presents for evaluation of bilateral ear pain that is worse on the left. Patient states this been ongoing for the past 2 to 3 days. She denies any cough, congestion, runny nose, sore throat or any other upper respiratory complaints. She denies any fever or chills. States that she has not been taking any medications for this at home but does report a history of recurrent ear infections. States that she was given information to see an ENT at one point, however was unable to follow-up due to coronavirus and would like to receive more information. The history is provided by the patient. REVIEW OF SYSTEMS     Review of Systems   Constitutional: Negative for chills and fever. HENT: Positive for ear discharge and ear pain. Negative for congestion, hearing loss and sore throat. Respiratory: Negative for cough and shortness of breath. Cardiovascular: Negative for chest pain. Gastrointestinal: Negative for nausea and vomiting. Musculoskeletal: Negative for arthralgias and myalgias. Skin: Negative for rash. Allergic/Immunologic: Negative for immunocompromised state. Neurological: Negative for headaches. PAST MEDICAL HISTORY         Diagnosis Date    GERD (gastroesophageal reflux disease)     Vertigo        SURGICALHISTORY     Patient  has a past surgical history that includes Tubal ligation. CURRENT MEDICATIONS       Previous Medications    IBUPROFEN (ADVIL;MOTRIN) 600 MG TABLET    Take 600 mg by mouth every 6 hours as needed for Pain    OMEPRAZOLE (PRILOSEC) 20 MG DELAYED RELEASE CAPSULE    Take 1 capsule by mouth daily       ALLERGIES     Patient is has No Known Allergies.     Patients   There is no immunization history on file for this patient. FAMILY HISTORY     Patient's family history includes Diabetes in her mother; Early Death (age of onset: 32) in her sister; Heart Disease in her mother; High Blood Pressure in her mother and sister; High Cholesterol in her mother; No Known Problems in her brother, brother, brother, and father; Stroke in her mother. SOCIAL HISTORY     Patient  reports that she has been smoking cigarettes. She has been smoking about 0.50 packs per day. She has never used smokeless tobacco. She reports that she does not drink alcohol or use drugs. PHYSICAL EXAM     ED TRIAGE VITALS  BP: 120/63, Temp: 97.7 °F (36.5 °C), Pulse: 93, Resp: 20, SpO2: 98 %,Estimated body mass index is 33.45 kg/m² as calculated from the following:    Height as of this encounter: 5' 8\" (1.727 m). Weight as of this encounter: 220 lb (99.8 kg). ,Patient's last menstrual period was 09/24/2020. Physical Exam  Vitals signs and nursing note reviewed. Constitutional:       General: She is not in acute distress. Appearance: She is well-developed. She is not diaphoretic. HENT:      Right Ear: Swelling and tenderness present. Tympanic membrane is erythematous and bulging. Left Ear: Swelling and tenderness present. Tympanic membrane is erythematous and bulging. Mouth/Throat:      Mouth: Mucous membranes are moist.      Pharynx: Oropharynx is clear. Tonsils: No tonsillar exudate. 0 on the right. 0 on the left. Eyes:      Conjunctiva/sclera:      Right eye: Right conjunctiva is not injected. Left eye: Left conjunctiva is not injected. Pupils: Pupils are equal.   Neck:      Musculoskeletal: Normal range of motion. Cardiovascular:      Rate and Rhythm: Normal rate and regular rhythm. Heart sounds: No murmur. Pulmonary:      Effort: Pulmonary effort is normal. No respiratory distress. Breath sounds: Normal breath sounds.    Musculoskeletal:      Right knee: She exhibits normal range of motion. Left knee: She exhibits normal range of motion. Skin:     General: Skin is warm. Findings: No rash. Neurological:      Mental Status: She is alert and oriented to person, place, and time. Psychiatric:         Behavior: Behavior normal.         DIAGNOSTIC RESULTS     Labs:No results found for this visit on 09/25/20. IMAGING:    No orders to display         EKG:  none    URGENT CARE COURSE:     Vitals:    09/25/20 1344   BP: 120/63   Pulse: 93   Resp: 20   Temp: 97.7 °F (36.5 °C)   TempSrc: Temporal   SpO2: 98%   Weight: 220 lb (99.8 kg)   Height: 5' 8\" (1.727 m)       Medications - No data to display         PROCEDURES:  None    FINAL IMPRESSION      1. Bilateral otitis media, unspecified otitis media type    2. Infective otitis externa of both ears          DISPOSITION/ PLAN       Exam is consistent with bilateral otitis media and externa at this time. I discussed with the patient we will plan to treat with oral and topical antibiotics and she is advised to keep the ears clean and dry. She is given information of an ENT that she may follow-up with as soon as possible and she is agreeable to plan as discussed. PATIENT REFERRED TO:  No primary care provider on file. No primary physician on file.       DISCHARGE MEDICATIONS:  New Prescriptions    CEFDINIR (OMNICEF) 300 MG CAPSULE    Take 1 capsule by mouth 2 times daily for 10 days    OFLOXACIN (FLOXIN) 0.3 % OTIC SOLUTION    Place 5 drops into both ears 3 times daily for 10 days       Discontinued Medications    No medications on file       Current Discharge Medication List          SONNY Aguiar CNP    (Please note that portions of this note were completed with a voice recognition program. Efforts were made to edit the dictations but occasionally words are mis-transcribed.)          SONNY Aguiar CNP  09/25/20 8260

## 2020-10-10 ENCOUNTER — HOSPITAL ENCOUNTER (EMERGENCY)
Age: 40
Discharge: HOME | End: 2020-10-10
Payer: COMMERCIAL

## 2020-10-10 VITALS
DIASTOLIC BLOOD PRESSURE: 72 MMHG | HEIGHT: 68 IN | OXYGEN SATURATION: 98 % | SYSTOLIC BLOOD PRESSURE: 118 MMHG | TEMPERATURE: 97.5 F | HEART RATE: 85 BPM | BODY MASS INDEX: 33.34 KG/M2 | RESPIRATION RATE: 16 BRPM | WEIGHT: 220 LBS

## 2020-10-10 PROCEDURE — 99212 OFFICE O/P EST SF 10 MIN: CPT

## 2020-10-10 PROCEDURE — 99213 OFFICE O/P EST LOW 20 MIN: CPT | Performed by: NURSE PRACTITIONER

## 2020-10-10 RX ORDER — CIPROFLOXACIN AND DEXAMETHASONE 3; 1 MG/ML; MG/ML
4 SUSPENSION/ DROPS AURICULAR (OTIC) 2 TIMES DAILY
Qty: 1 BOTTLE | Refills: 0 | Status: SHIPPED | OUTPATIENT
Start: 2020-10-10 | End: 2020-10-17

## 2020-10-10 ASSESSMENT — ENCOUNTER SYMPTOMS
SINUS PAIN: 0
SHORTNESS OF BREATH: 0
VOMITING: 0
SORE THROAT: 0
DIARRHEA: 0
WHEEZING: 0
RHINORRHEA: 0
COUGH: 0
SINUS PRESSURE: 0
CONSTIPATION: 0
ABDOMINAL PAIN: 0
NAUSEA: 0

## 2020-10-10 ASSESSMENT — PAIN DESCRIPTION - ORIENTATION: ORIENTATION: LEFT;RIGHT

## 2020-10-10 ASSESSMENT — PAIN SCALES - GENERAL: PAINLEVEL_OUTOF10: 7

## 2020-10-10 ASSESSMENT — PAIN DESCRIPTION - LOCATION: LOCATION: EAR

## 2020-10-10 NOTE — ED TRIAGE NOTES
To ro0om 1 c/o bilateral ear pain. Pt reports recently and finished oral and drop antibiotics.  Pain resttarted couple days ago

## 2020-10-10 NOTE — ED PROVIDER NOTES
39 [address]       Chief Complaint   Patient presents with    Otalgia     BILATERAL  RECURRIING  FINISHED OMNICEF AND EAR DROPS A FEW DAYS AGO. PAIN RESTARTED 2 DAYS AGFO       Nurses Notes reviewed and I agree except as noted in the HPI. HISTORY OF PRESENT ILLNESS   Christina Cervantes is a 38 y.o. female who presents with complaints of bilateral ear pain that began 2 days ago. Patient states this is a recurring problem. Reports she was seen in this facility a couple weeks ago for the same problem. Was given both eardrops and oral antibiotics, which she used as directed. She completed that treatment about 5 days ago and symptoms were improved. Then 2 days ago she began to experience bilateral ear pain again. The pain is getting worse. She states she saw ENT several months ago but did not find it beneficial.  She denies any pain or injury. She does smoke. She has not tried any over-the-counter medications for the symptoms. Denies headache, dizziness, fever, sore throat, sinus pressure congestion. She has not had any cough, wheezing, shortness of breath. Has not been around anybody with known illness and has not had any recent travel. REVIEW OF SYSTEMS     Review of Systems   Constitutional: Negative for fatigue and fever. HENT: Positive for ear pain. Negative for congestion, ear discharge, postnasal drip, rhinorrhea, sinus pressure, sinus pain, sneezing and sore throat. Respiratory: Negative for cough, shortness of breath and wheezing. Cardiovascular: Negative for chest pain and palpitations. Gastrointestinal: Negative for abdominal pain, constipation, diarrhea, nausea and vomiting. Skin: Negative for rash. Neurological: Negative for dizziness, numbness and headaches.        PAST MEDICAL HISTORY         Diagnosis Date    GERD (gastroesophageal reflux disease)     Vertigo        SURGICAL HISTORY     Patient  has a past surgical history that includes Tubal ligation. CURRENT MEDICATIONS       Discharge Medication List as of 10/10/2020 10:44 AM      CONTINUE these medications which have NOT CHANGED    Details   omeprazole (PRILOSEC) 20 MG delayed release capsule Take 1 capsule by mouth daily, Disp-30 capsule, R-5Normal      ibuprofen (ADVIL;MOTRIN) 600 MG tablet Take 600 mg by mouth every 6 hours as needed for PainHistorical Med             ALLERGIES     Patient is has No Known Allergies. FAMILY HISTORY     Patient'sfamily history includes Diabetes in her mother; Early Death (age of onset: 32) in her sister; Heart Disease in her mother; High Blood Pressure in her mother and sister; High Cholesterol in her mother; No Known Problems in her brother, brother, brother, and father; Stroke in her mother. SOCIAL HISTORY     Patient  reports that she has been smoking cigarettes. She has been smoking about 0.50 packs per day. She has never used smokeless tobacco. She reports that she does not drink alcohol or use drugs. PHYSICAL EXAM     ED TRIAGE VITALS  BP: 118/72, Temp: 97.5 °F (36.4 °C), Pulse: 85, Resp: 16, SpO2: 98 %  Physical Exam  Vitals signs and nursing note reviewed. Constitutional:       Appearance: Normal appearance. She is normal weight. HENT:      Head: Normocephalic and atraumatic. Right Ear: Hearing normal. Swelling and tenderness present. No drainage. No mastoid tenderness. Tympanic membrane is erythematous. Left Ear: Hearing normal. Swelling and tenderness present. No drainage. No mastoid tenderness. Tympanic membrane is erythematous. Nose: Nose normal.      Mouth/Throat:      Mouth: Mucous membranes are moist.   Neck:      Musculoskeletal: Normal range of motion and neck supple. Cardiovascular:      Rate and Rhythm: Normal rate and regular rhythm.       Heart sounds: Normal heart sounds, S1 normal and S2 normal.   Pulmonary:      Effort: Pulmonary effort is normal.      Breath sounds: Normal breath sounds and air entry. Neurological:      Mental Status: She is alert and oriented to person, place, and time. GCS: GCS eye subscore is 4. GCS verbal subscore is 5. GCS motor subscore is 6. Psychiatric:         Behavior: Behavior is cooperative. DIAGNOSTIC RESULTS   Labs: No results found for this visit on 10/10/20. IMAGING:  No orders to display     URGENT CARE COURSE:     Vitals:    10/10/20 1021   BP: 118/72   Pulse: 85   Resp: 16   Temp: 97.5 °F (36.4 °C)   SpO2: 98%   Weight: 220 lb (99.8 kg)   Height: 5' 8\" (1.727 m)       Medications - No data to display  PROCEDURES:  None  FINALIMPRESSION      1. Infective otitis externa of both ears        DISPOSITION/PLAN   DISPOSITION Decision To Discharge 10/10/2020 10:41:36 AM    -Avoid water in the ear(s). -No foreign objects in the ear(s). -No swimming for the next week. -May use over the counter Tylenol or Motrin as directed per label instructions as needed for pain or fever.  -Take all prescription medications as prescribed  -Patient was also given a referral to ENT; she requested [facility] for 2nd opinion    PATIENT REFERRED TO:  No follow-up provider specified.   DISCHARGE MEDICATIONS:  Discharge Medication List as of 10/10/2020 10:44 AM      START taking these medications    Details   ciprofloxacin-dexamethasone (CIPRODEX) 0.3-0.1 % otic suspension Place 4 drops into both ears 2 times daily for 7 days, Disp-1 Bottle,R-0Normal           Discharge Medication List as of 10/10/2020 10:44 AM          SONNY Mendosa CNP, APRN - CNP  10/10/20 1513

## 2021-01-04 ENCOUNTER — HOSPITAL ENCOUNTER (EMERGENCY)
Age: 41
Discharge: HOME | End: 2021-01-04
Payer: COMMERCIAL

## 2021-01-04 VITALS
HEART RATE: 78 BPM | DIASTOLIC BLOOD PRESSURE: 77 MMHG | TEMPERATURE: 98.3 F | RESPIRATION RATE: 16 BRPM | SYSTOLIC BLOOD PRESSURE: 118 MMHG | OXYGEN SATURATION: 98 %

## 2021-01-04 DIAGNOSIS — H60.393 INFECTIVE OTITIS EXTERNA, BILATERAL: ICD-10-CM

## 2021-01-04 DIAGNOSIS — H65.91 RIGHT OTITIS MEDIA WITH EFFUSION: Primary | ICD-10-CM

## 2021-01-04 PROCEDURE — 99213 OFFICE O/P EST LOW 20 MIN: CPT

## 2021-01-04 PROCEDURE — 99213 OFFICE O/P EST LOW 20 MIN: CPT | Performed by: NURSE PRACTITIONER

## 2021-01-04 RX ORDER — CIPROFLOXACIN AND DEXAMETHASONE 3; 1 MG/ML; MG/ML
4 SUSPENSION/ DROPS AURICULAR (OTIC) 2 TIMES DAILY
Qty: 1 BOTTLE | Refills: 0 | Status: SHIPPED | OUTPATIENT
Start: 2021-01-04 | End: 2021-01-11

## 2021-01-04 RX ORDER — AMOXICILLIN AND CLAVULANATE POTASSIUM 875; 125 MG/1; MG/1
1 TABLET, FILM COATED ORAL 2 TIMES DAILY
Qty: 20 TABLET | Refills: 0 | Status: SHIPPED | OUTPATIENT
Start: 2021-01-04 | End: 2021-01-14

## 2021-01-04 RX ORDER — IBUPROFEN 800 MG/1
800 TABLET ORAL EVERY 8 HOURS PRN
Qty: 30 TABLET | Refills: 0 | Status: SHIPPED | OUTPATIENT
Start: 2021-01-04 | End: 2021-03-23 | Stop reason: SDUPTHER

## 2021-01-04 ASSESSMENT — PAIN DESCRIPTION - FREQUENCY: FREQUENCY: CONTINUOUS

## 2021-01-04 ASSESSMENT — ENCOUNTER SYMPTOMS
NAUSEA: 0
TROUBLE SWALLOWING: 0
SINUS PRESSURE: 0
DIARRHEA: 0
SHORTNESS OF BREATH: 0
BACK PAIN: 0
VOMITING: 0
RHINORRHEA: 0
COUGH: 0
SORE THROAT: 0

## 2021-01-04 ASSESSMENT — PAIN DESCRIPTION - PAIN TYPE: TYPE: ACUTE PAIN

## 2021-01-04 ASSESSMENT — PAIN DESCRIPTION - ORIENTATION: ORIENTATION: RIGHT;LEFT

## 2021-01-04 ASSESSMENT — PAIN DESCRIPTION - LOCATION: LOCATION: EAR

## 2021-01-04 NOTE — ED PROVIDER NOTES
[facility]  Urgent Care Encounter       CHIEF COMPLAINT       Chief Complaint   Patient presents with    Otalgia    Dizziness       Nurses Notes reviewed and I agree except as noted in the HPI. HISTORY OF PRESENT ILLNESS   Rolando Ochoa is a 38 y.o. female who presents to the urgent care center complaining of bilateral ear pain and lightheadedness. The patient rates her pain 3 on a 10 scale to both right and left ear. Patient states that she has had the symptoms for about 3 days. Patient stated that she has had drainage coming from the left ear patient does have redness noted externally to the ear with dried drainage noted. Patient denied neck pain or stiffness patient states that she has had some nasal congestion as well. She does have a history of ear infections and apparently seen by ENT at [facility] but she does not remember the doctor's name. The patient denies any chest pain or shortness of breath. At the present time patient is sitting on the table does not appear to be in any acute distress skin is warm and dry. The history is provided by the patient. No  was used. Ear Problem  Location:  Bilateral  Behind ear:  No abnormality  Quality:  Sore and aching  Severity:  Mild  Onset quality:  Sudden  Duration:  3 days  Timing:  Constant  Progression:  Worsening  Chronicity:  New  Associated symptoms: ear discharge    Associated symptoms: no congestion, no cough, no diarrhea, no fever, no headaches, no rash, no rhinorrhea, no sore throat and no vomiting        REVIEW OF SYSTEMS     Review of Systems   Constitutional: Negative for activity change, appetite change, chills, fatigue and fever. HENT: Positive for ear discharge and ear pain. Negative for congestion, rhinorrhea, sinus pressure, sore throat and trouble swallowing. Respiratory: Negative for cough and shortness of breath. Cardiovascular: Negative for chest pain. Gastrointestinal: Negative for diarrhea, nausea and vomiting. Musculoskeletal: Negative for back pain and neck stiffness. Skin: Negative for rash. Allergic/Immunologic: Negative for environmental allergies. Neurological: Positive for dizziness and light-headedness. Negative for headaches. Hematological: Negative for adenopathy. PAST MEDICAL HISTORY         Diagnosis Date    GERD (gastroesophageal reflux disease)     Vertigo        SURGICALHISTORY     Patient  has a past surgical history that includes Tubal ligation. CURRENT MEDICATIONS       Discharge Medication List as of 1/4/2021 11:40 AM          ALLERGIES     Patient is has No Known Allergies. Patients   There is no immunization history on file for this patient. FAMILY HISTORY     Patient's family history includes Diabetes in her mother; Early Death (age of onset: 32) in her sister; Heart Disease in her mother; High Blood Pressure in her mother and sister; High Cholesterol in her mother; No Known Problems in her brother, brother, brother, and father; Stroke in her mother. SOCIAL HISTORY     Patient  reports that she has been smoking cigarettes. She has been smoking about 0.50 packs per day. She has never used smokeless tobacco. She reports that she does not drink alcohol or use drugs. PHYSICAL EXAM     ED TRIAGE VITALS  BP: 118/77, Temp: 98.3 °F (36.8 °C), Pulse: 78, Resp: 16, SpO2: 98 %,Estimated body mass index is 33.45 kg/m² as calculated from the following:    Height as of 10/10/20: 5' 8\" (1.727 m). Weight as of 10/10/20: 220 lb (99.8 kg). ,No LMP recorded. Physical Exam  Vitals signs and nursing note reviewed. Constitutional:       General: She is not in acute distress. Appearance: Normal appearance. She is well-developed and well-groomed. She is not ill-appearing, toxic-appearing or diaphoretic. HENT:      Head: Normocephalic.       Right Ear: Hearing, ear canal and external ear normal. Drainage and swelling present. No tenderness. No mastoid tenderness. Tympanic membrane is erythematous. Left Ear: Hearing, tympanic membrane, ear canal and external ear normal. Drainage, swelling and tenderness present. No mastoid tenderness. Tympanic membrane is not erythematous. Nose: Nose normal.      Right Sinus: No maxillary sinus tenderness or frontal sinus tenderness. Left Sinus: No maxillary sinus tenderness or frontal sinus tenderness. Mouth/Throat:      Lips: Pink. Mouth: Mucous membranes are moist.      Pharynx: Uvula midline. No posterior oropharyngeal erythema or uvula swelling. Tonsils: No tonsillar exudate or tonsillar abscesses. Eyes:      Conjunctiva/sclera: Conjunctivae normal.      Pupils: Pupils are equal, round, and reactive to light. Neck:      Musculoskeletal: Full passive range of motion without pain and normal range of motion. No neck rigidity. Cardiovascular:      Rate and Rhythm: Normal rate and regular rhythm. Heart sounds: Normal heart sounds. Pulmonary:      Effort: Pulmonary effort is normal. No accessory muscle usage. Breath sounds: Normal breath sounds. No decreased breath sounds, wheezing, rhonchi or rales. Abdominal:      General: Bowel sounds are normal.      Palpations: Abdomen is soft. Tenderness: There is no abdominal tenderness. There is no right CVA tenderness, left CVA tenderness or guarding. Negative signs include Perkins's sign. Lymphadenopathy:      Head:      Right side of head: No submental, submandibular, tonsillar, preauricular, posterior auricular or occipital adenopathy. Left side of head: No submental, submandibular, tonsillar, preauricular, posterior auricular or occipital adenopathy. Cervical: No cervical adenopathy. Right cervical: No superficial, deep or posterior cervical adenopathy. Left cervical: No superficial, deep or posterior cervical adenopathy.       Upper Body:      Right upper body: No supraclavicular adenopathy. Left upper body: No supraclavicular adenopathy. Skin:     General: Skin is warm and dry. Capillary Refill: Capillary refill takes less than 2 seconds. Findings: No rash. Neurological:      Mental Status: She is alert and oriented to person, place, and time. Psychiatric:         Mood and Affect: Mood normal.         Behavior: Behavior normal. Behavior is cooperative. DIAGNOSTIC RESULTS     Labs:No results found for this visit on 01/04/21. IMAGING:    No orders to display         EKG:      URGENT CARE COURSE:     Vitals:    01/04/21 1119   BP: 118/77   Pulse: 78   Resp: 16   Temp: 98.3 °F (36.8 °C)   TempSrc: Temporal   SpO2: 98%       Medications - No data to display         PROCEDURES:  None    FINAL IMPRESSION      1. Right otitis media with effusion    2. Infective otitis externa, bilateral          DISPOSITION/ PLAN      The parent or patient representative was advised that at this point the patient can be treated safely at home, the parent or Patient representative should be aware of following interventions and  advised to the watch for the following:  #1. Any increasing pain not controlled with Motrin or Tylenol. #2. Any development of drainage from the ears redness of the auricle or posterior ear. #3.  Her development of the any fever chills, headache or stiffness of the neck the patient needs to be reevaluated by the primary care provider, return here or go to the emergency department for reevaluation. The patient or patient's representative are agreeable to the outpatient management at this time. They are advised to follow-up with her primary care provider in 2-3 days for reevaluation. The patient left ambulatory without any problems. PATIENT REFERRED TO:  No primary care provider on file. No primary physician on file.       DISCHARGE MEDICATIONS:  Discharge Medication List as of 1/4/2021 11:40 AM      START taking these medications Details   amoxicillin-clavulanate (AUGMENTIN) 875-125 MG per tablet Take 1 tablet by mouth 2 times daily for 10 days, Disp-20 tablet, R-0Normal      ciprofloxacin-dexamethasone (CIPRODEX) 0.3-0.1 % otic suspension Place 4 drops into both ears 2 times daily for 7 days, Disp-1 Bottle, R-0Normal      ibuprofen (ADVIL;MOTRIN) 800 MG tablet Take 1 tablet by mouth every 8 hours as needed for Pain, Disp-30 tablet, R-0Normal             Discharge Medication List as of 1/4/2021 11:40 AM      STOP taking these medications       omeprazole (PRILOSEC) 20 MG delayed release capsule Comments:   Reason for Stopping:               Discharge Medication List as of 1/4/2021 11:40 AM          SONNY Rodriguez CNP    (Please note that portions of this note were completed with a voice recognition program. Efforts were made to edit the dictations but occasionally words are mis-transcribed.)           SONNY Rodriguez CNP  01/04/21 1454

## 2021-01-04 NOTE — ED TRIAGE NOTES
Pelon Class arrives to room with complaint of bi lat ear pain and vertigo symptoms started 3 days ago.

## 2021-03-23 ENCOUNTER — HOSPITAL ENCOUNTER (EMERGENCY)
Age: 41
Discharge: HOME | End: 2021-03-23
Payer: COMMERCIAL

## 2021-03-23 VITALS
OXYGEN SATURATION: 99 % | HEART RATE: 109 BPM | WEIGHT: 212 LBS | DIASTOLIC BLOOD PRESSURE: 80 MMHG | SYSTOLIC BLOOD PRESSURE: 137 MMHG | RESPIRATION RATE: 16 BRPM | BODY MASS INDEX: 32.23 KG/M2 | TEMPERATURE: 97.6 F

## 2021-03-23 DIAGNOSIS — G57.92 NEUROPATHY OF LEFT LOWER EXTREMITY: Primary | ICD-10-CM

## 2021-03-23 DIAGNOSIS — H65.91 RIGHT OTITIS MEDIA WITH EFFUSION: ICD-10-CM

## 2021-03-23 PROCEDURE — 99213 OFFICE O/P EST LOW 20 MIN: CPT

## 2021-03-23 PROCEDURE — 99213 OFFICE O/P EST LOW 20 MIN: CPT | Performed by: NURSE PRACTITIONER

## 2021-03-23 RX ORDER — IBUPROFEN 400 MG/1
400 TABLET ORAL EVERY 8 HOURS PRN
Qty: 30 TABLET | Refills: 0 | Status: SHIPPED | OUTPATIENT
Start: 2021-03-23

## 2021-03-23 RX ORDER — MENTHOL/CAMPHOR
OINTMENT (GRAM) TOPICAL
Refills: 0 | COMMUNITY
Start: 2021-03-23 | End: 2021-09-06

## 2021-03-23 ASSESSMENT — ENCOUNTER SYMPTOMS
APNEA: 0
COUGH: 0
CHEST TIGHTNESS: 0
BACK PAIN: 0
CHOKING: 0
STRIDOR: 0
WHEEZING: 0
SHORTNESS OF BREATH: 0
COLOR CHANGE: 0

## 2021-03-23 ASSESSMENT — PAIN DESCRIPTION - FREQUENCY: FREQUENCY: CONTINUOUS

## 2021-03-23 ASSESSMENT — PAIN DESCRIPTION - PROGRESSION: CLINICAL_PROGRESSION: NOT CHANGED

## 2021-03-23 ASSESSMENT — PAIN - FUNCTIONAL ASSESSMENT: PAIN_FUNCTIONAL_ASSESSMENT: PREVENTS OR INTERFERES SOME ACTIVE ACTIVITIES AND ADLS

## 2021-03-23 ASSESSMENT — PAIN DESCRIPTION - DESCRIPTORS: DESCRIPTORS: ACHING;CONSTANT;TINGLING

## 2021-03-23 NOTE — ED TRIAGE NOTES
Pt to room 2 with c/o left leg pain, front of leg, shin area and around to the back, then radiating up her leg. No known injury.

## 2021-03-23 NOTE — ED PROVIDER NOTES
[facility]  Urgent Care Encounter      CHIEF COMPLAINT       Chief Complaint   Patient presents with    Leg Pain     left shin area and radiating up toward her knee, no known injury       Nurses Notes reviewed and I agree except as noted in the HPI. HISTORY OFPRESENT ILLNESS   Sadie Jennings is a 42 y.o. The history is provided by the patient. No  was used. Leg Injury  Location:  Knee  Time since incident:  6 hours  Injury: no    Knee location:  L knee  Pain details:     Quality:  Aching and tingling    Radiates to:  Does not radiate    Severity:  Moderate    Onset quality:  Sudden    Duration:  6 hours    Timing:  Constant    Progression:  Unchanged  Chronicity:  New  Dislocation: no    Foreign body present:  No foreign bodies  Tetanus status:  Unknown  Prior injury to area:  No  Relieved by:  Nothing  Worsened by:  Nothing  Ineffective treatments:  None tried  Associated symptoms: numbness and tingling    Associated symptoms: no back pain, no decreased ROM, no fatigue, no fever, no itching, no muscle weakness, no neck pain, no stiffness and no swelling    Risk factors: obesity    Risk factors: no concern for non-accidental trauma, no frequent fractures, no known bone disorder and no recent illness        REVIEW OF SYSTEMS     Review of Systems   Constitutional: Negative for activity change, appetite change, chills, diaphoresis, fatigue and fever. Respiratory: Negative for apnea, cough, choking, chest tightness, shortness of breath, wheezing and stridor. Cardiovascular: Negative for chest pain, palpitations and leg swelling. Musculoskeletal: Positive for myalgias. Negative for back pain, gait problem, joint swelling, neck pain and stiffness. Skin: Negative for color change, itching, pallor, rash and wound.        PAST MEDICAL HISTORY         Diagnosis Date    GERD (gastroesophageal reflux disease)     Vertigo        SURGICAL HISTORY     Patient  has a past tibial pulses are 2+ on the right side and 2+ on the left side. Heart sounds: Normal heart sounds and S1 normal. Heart sounds not distant. No murmur. No friction rub. No gallop. No S3 or S4 sounds. Pulmonary:      Effort: Pulmonary effort is normal.   Musculoskeletal: Normal range of motion. Left knee: She exhibits normal range of motion, no swelling, no effusion, no ecchymosis, no deformity, no laceration, no erythema, normal alignment, no LCL laxity, normal patellar mobility, no bony tenderness, normal meniscus and no MCL laxity. No tenderness found. Right lower leg: No edema. Left lower leg: No edema. Lymphadenopathy:      Cervical: No cervical adenopathy. Skin:     General: Skin is warm. Neurological:      General: No focal deficit present. Mental Status: She is alert and oriented to person, place, and time. Psychiatric:         Mood and Affect: Mood normal.         Behavior: Behavior normal.         Thought Content: Thought content normal.         Judgment: Judgment normal.         DIAGNOSTIC RESULTS   Labs:No results found for this visit on 03/23/21. IMAGING:  No orders to display     URGENT CARE COURSE:     Vitals:    03/23/21 1134   BP: 137/80   Pulse: 109   Resp: 16   Temp: 97.6 °F (36.4 °C)   TempSrc: Temporal   SpO2: 99%   Weight: 212 lb (96.2 kg)       Medications - No data to display  PROCEDURES:  None  FINAL IMPRESSION      1. Neuropathy of left lower extremity    2. Right otitis media with effusion        DISPOSITION/PLAN   Decision To Discharge      Heat 15-20 minutes 3 times a day   Activity as tolerated. Take medication as directed  If symptoms persist follow up with PCP 1-2 weeks.       PATIENT REFERRED TO:  [facility] [place] [address] [id number]  Go today  If symptoms worsen    [address] [facility]  [address] [address]  Call today  [phone] for appointment    DISCHARGE MEDICATIONS:  Discharge Medication List as of 3/23/2021 11:54 AM      START taking these medications    Details   Magnesium Sulfate, Laxative, (EPSOM SALT) GRAN Follow package directions for topical use., R-0, OTC      Menthol-Camphor (TIGER BALM EXTRA STRENGTH) 11-10 % OINT Follow package directions for topical use., R-0OTC           Discharge Medication List as of 3/23/2021 11:54 AM      CONTINUE these medications which have CHANGED    Details   ibuprofen (ADVIL;MOTRIN) 400 MG tablet Take 1 tablet by mouth every 8 hours as needed for Pain, Disp-30 tablet, R-0Normal             Juan Espinoza, APRN - AMY Espinoza, SONNY - AMY  03/23/21 0670

## 2021-06-14 ENCOUNTER — HOSPITAL ENCOUNTER (EMERGENCY)
Age: 41
Discharge: HOME | End: 2021-06-14
Payer: COMMERCIAL

## 2021-06-14 VITALS
WEIGHT: 220 LBS | SYSTOLIC BLOOD PRESSURE: 114 MMHG | HEART RATE: 94 BPM | TEMPERATURE: 97 F | HEIGHT: 68 IN | BODY MASS INDEX: 33.34 KG/M2 | RESPIRATION RATE: 16 BRPM | OXYGEN SATURATION: 99 % | DIASTOLIC BLOOD PRESSURE: 72 MMHG

## 2021-06-14 DIAGNOSIS — H66.92 INFECTIVE LEFT OTITIS MEDIA: ICD-10-CM

## 2021-06-14 DIAGNOSIS — H60.393 INFECTIVE OTITIS EXTERNA OF BOTH EARS: Primary | ICD-10-CM

## 2021-06-14 PROCEDURE — 99213 OFFICE O/P EST LOW 20 MIN: CPT | Performed by: NURSE PRACTITIONER

## 2021-06-14 PROCEDURE — 87077 CULTURE AEROBIC IDENTIFY: CPT

## 2021-06-14 PROCEDURE — 87147 CULTURE TYPE IMMUNOLOGIC: CPT

## 2021-06-14 PROCEDURE — 87070 CULTURE OTHR SPECIMN AEROBIC: CPT

## 2021-06-14 PROCEDURE — 99213 OFFICE O/P EST LOW 20 MIN: CPT

## 2021-06-14 PROCEDURE — 87205 SMEAR GRAM STAIN: CPT

## 2021-06-14 PROCEDURE — 87186 SC STD MICRODIL/AGAR DIL: CPT

## 2021-06-14 RX ORDER — CIPROFLOXACIN AND DEXAMETHASONE 3; 1 MG/ML; MG/ML
4 SUSPENSION/ DROPS AURICULAR (OTIC) 2 TIMES DAILY
Qty: 1 BOTTLE | Refills: 0 | Status: SHIPPED | OUTPATIENT
Start: 2021-06-14 | End: 2021-06-21

## 2021-06-14 RX ORDER — AMOXICILLIN AND CLAVULANATE POTASSIUM 875; 125 MG/1; MG/1
1 TABLET, FILM COATED ORAL 2 TIMES DAILY
Qty: 20 TABLET | Refills: 0 | Status: SHIPPED | OUTPATIENT
Start: 2021-06-14 | End: 2021-06-24

## 2021-06-14 RX ORDER — ACETAMINOPHEN 500 MG
1000 TABLET ORAL EVERY 6 HOURS PRN
COMMUNITY

## 2021-06-14 ASSESSMENT — ENCOUNTER SYMPTOMS
RHINORRHEA: 0
NAUSEA: 0
SINUS PRESSURE: 0
VOMITING: 0
DIARRHEA: 0
BACK PAIN: 0
COUGH: 0
SHORTNESS OF BREATH: 0
SORE THROAT: 0
TROUBLE SWALLOWING: 0

## 2021-06-14 ASSESSMENT — PAIN DESCRIPTION - FREQUENCY: FREQUENCY: CONTINUOUS

## 2021-06-14 ASSESSMENT — PAIN DESCRIPTION - ORIENTATION: ORIENTATION: RIGHT;LEFT

## 2021-06-14 ASSESSMENT — PAIN DESCRIPTION - PAIN TYPE: TYPE: ACUTE PAIN

## 2021-06-14 ASSESSMENT — PAIN DESCRIPTION - DESCRIPTORS: DESCRIPTORS: ACHING

## 2021-06-14 ASSESSMENT — PAIN SCALES - GENERAL: PAINLEVEL_OUTOF10: 6

## 2021-06-14 ASSESSMENT — PAIN DESCRIPTION - LOCATION: LOCATION: EAR

## 2021-06-14 NOTE — ED NOTES
Pt verbalized discharge instructions. Pt informed to go to ER if develop chest pain, shortness of breath or abdominal pain. Pt ambulatory out in stable condition. Assessment unchanged.        Rajani Mccallum RN  06/14/21 9653

## 2021-06-14 NOTE — ED TRIAGE NOTES
Pt ambulatory into esuc with c/o bilateral ear pain and drainage for the past day. Pt denies pain at this time. Pt has seen ENT in past but did not have symptoms.

## 2021-06-14 NOTE — ED PROVIDER NOTES
[facility]  Urgent Care Encounter       CHIEF COMPLAINT       Chief Complaint   Patient presents with    Otalgia     bilateral ear pain and drainage       Nurses Notes reviewed and I agree except as noted in the HPI. HISTORY OF PRESENT ILLNESS   Nito Rodgers is a 42 y.o. female who presents to the urgent care center complaining of bilateral ear pain and drainage from both the ears that started 1 day ago. The patient has a history of chronic ear infections. The patient states that she has been seen by ear nose and throat and was told that if she has drainage or another infection to give them a call and they will try to see her and get a culture. She stated she did try to call the office and they could not see her for quite some time. Patient came here for evaluation. Currently rates her ear pain 6 on a 10 scale. She denied any fever, chills complains of tenderness to the ears externally with palpation. Patient denies any tinnitus or vertigo but does have hearing loss. She denies any other complaints at the present time. The history is provided by the patient. No  was used. Ear Problem  Location:  Bilateral  Behind ear:  No abnormality  Onset quality:  Sudden  Duration:  1 day  Timing:  Rare  Progression:  Worsening  Chronicity:  New  Relieved by:  Nothing  Worsened by:  Nothing  Ineffective treatments:  None tried  Associated symptoms: ear discharge and hearing loss    Associated symptoms: no congestion, no cough, no diarrhea, no fever, no headaches, no neck pain, no rash, no rhinorrhea, no sore throat, no tinnitus and no vomiting    Risk factors: chronic ear infection        REVIEW OF SYSTEMS     Review of Systems   Constitutional: Negative for activity change, appetite change, chills, fatigue and fever. HENT: Positive for ear discharge, ear pain and hearing loss.  Negative for congestion, rhinorrhea, sinus pressure, sore throat, tinnitus and trouble swallowing. Respiratory: Negative for cough and shortness of breath. Cardiovascular: Negative for chest pain. Gastrointestinal: Negative for diarrhea, nausea and vomiting. Musculoskeletal: Negative for back pain, neck pain and neck stiffness. Skin: Negative for rash. Allergic/Immunologic: Negative for environmental allergies. Neurological: Negative for dizziness, light-headedness and headaches. Hematological: Negative for adenopathy. PAST MEDICAL HISTORY         Diagnosis Date    GERD (gastroesophageal reflux disease)     Vertigo        SURGICALHISTORY     Patient  has a past surgical history that includes Tubal ligation. CURRENT MEDICATIONS       Previous Medications    ACETAMINOPHEN (TYLENOL) 500 MG TABLET    Take 1,000 mg by mouth every 6 hours as needed for Pain    IBUPROFEN (ADVIL;MOTRIN) 400 MG TABLET    Take 1 tablet by mouth every 8 hours as needed for Pain    MAGNESIUM SULFATE, LAXATIVE, (EPSOM SALT) GRAN    Follow package directions for topical use. MENTHOL-CAMPHOR (TIGER BALM EXTRA STRENGTH) 11-10 % OINT    Follow package directions for topical use. ALLERGIES     Patient is has No Known Allergies. Patients   There is no immunization history on file for this patient. FAMILY HISTORY     Patient's family history includes Diabetes in her mother; Early Death (age of onset: 32) in her sister; Heart Disease in her mother; High Blood Pressure in her mother and sister; High Cholesterol in her mother; No Known Problems in her brother, brother, brother, and father; Stroke in her mother. SOCIAL HISTORY     Patient  reports that she has been smoking cigarettes. She has been smoking about 0.50 packs per day. She has never used smokeless tobacco. She reports that she does not drink alcohol and does not use drugs.     PHYSICAL EXAM     ED TRIAGE VITALS  BP: 114/72, Temp: 97 °F (36.1 °C), Pulse: 94, Resp: 16, SpO2: 99 %,Estimated body mass index is 33.45 kg/m² as calculated rigidity. Lymphadenopathy:      Head:      Right side of head: No submental, submandibular, tonsillar, preauricular, posterior auricular or occipital adenopathy. Left side of head: No submental, submandibular, tonsillar, preauricular, posterior auricular or occipital adenopathy. Cervical: No cervical adenopathy. Right cervical: No superficial, deep or posterior cervical adenopathy. Left cervical: No superficial, deep or posterior cervical adenopathy. Upper Body:      Right upper body: No supraclavicular adenopathy. Left upper body: No supraclavicular adenopathy. Skin:     General: Skin is warm and dry. Capillary Refill: Capillary refill takes less than 2 seconds. Findings: No rash. Neurological:      Mental Status: She is alert and oriented to person, place, and time. Psychiatric:         Mood and Affect: Mood normal.         Behavior: Behavior normal. Behavior is cooperative. DIAGNOSTIC RESULTS     Labs:No results found for this visit on 06/14/21. Patient did have aerobic cultures of the ears and was sent to the lab. IMAGING:    No orders to display         EKG:      URGENT CARE COURSE:     Vitals:    06/14/21 1051   BP: 114/72   Pulse: 94   Resp: 16   Temp: 97 °F (36.1 °C)   TempSrc: Temporal   SpO2: 99%   Weight: 220 lb (99.8 kg)   Height: 5' 8\" (1.727 m)       Medications - No data to display         PROCEDURES:  None    FINAL IMPRESSION      1. Infective otitis externa of both ears    2. Infective left otitis media          DISPOSITION/ PLAN      The parent or patient representative was advised that at this point the patient can be treated safely at home, the parent or Patient representative should be aware of following interventions and  advised to the watch for the following:  #1. Any increasing pain not controlled with Motrin or Tylenol. #2. Any development of drainage from the ears redness of the auricle or posterior ear.   #3.  Her

## 2021-06-16 LAB
AEROBIC CULTURE: ABNORMAL
AEROBIC CULTURE: ABNORMAL
GRAM STAIN RESULT: ABNORMAL
ORGANISM: ABNORMAL

## 2021-06-17 ENCOUNTER — TELEPHONE (OUTPATIENT)
Dept: ENT CLINIC | Age: 41
End: 2021-06-17

## 2021-06-17 NOTE — TELEPHONE ENCOUNTER
Patient was at Urgent this week for ear infection. Seen with Jim Castelan in our office previously. Needs follow up apt around 10 days from now. Please call pt with appt.

## 2021-07-01 ENCOUNTER — OFFICE VISIT (OUTPATIENT)
Dept: ENT CLINIC | Age: 41
End: 2021-07-01
Payer: COMMERCIAL

## 2021-07-01 VITALS
DIASTOLIC BLOOD PRESSURE: 76 MMHG | WEIGHT: 249.1 LBS | BODY MASS INDEX: 37.75 KG/M2 | HEIGHT: 68 IN | TEMPERATURE: 97.2 F | RESPIRATION RATE: 12 BRPM | HEART RATE: 68 BPM | SYSTOLIC BLOOD PRESSURE: 118 MMHG

## 2021-07-01 DIAGNOSIS — H60.63 CHRONIC OTITIS EXTERNA OF BOTH EARS, UNSPECIFIED TYPE: Primary | ICD-10-CM

## 2021-07-01 DIAGNOSIS — H91.93 BILATERAL HEARING LOSS, UNSPECIFIED HEARING LOSS TYPE: ICD-10-CM

## 2021-07-01 PROCEDURE — 4004F PT TOBACCO SCREEN RCVD TLK: CPT | Performed by: PHYSICIAN ASSISTANT

## 2021-07-01 PROCEDURE — G8417 CALC BMI ABV UP PARAM F/U: HCPCS | Performed by: PHYSICIAN ASSISTANT

## 2021-07-01 PROCEDURE — G8427 DOCREV CUR MEDS BY ELIG CLIN: HCPCS | Performed by: PHYSICIAN ASSISTANT

## 2021-07-01 PROCEDURE — 99213 OFFICE O/P EST LOW 20 MIN: CPT | Performed by: PHYSICIAN ASSISTANT

## 2021-07-01 RX ORDER — DOXYCYCLINE HYCLATE 100 MG/1
CAPSULE ORAL
COMMUNITY
Start: 2021-06-17 | End: 2021-09-06 | Stop reason: ALTCHOICE

## 2021-07-01 RX ORDER — GENTAMICIN SULFATE 3 MG/ML
SOLUTION/ DROPS OPHTHALMIC
COMMUNITY
Start: 2021-06-17 | End: 2021-09-06 | Stop reason: ALTCHOICE

## 2021-07-01 NOTE — PROGRESS NOTES
[facility] [place] [facility]  [facility]AR[facility] NOSE [facility]  [facility] [place]  Dept: [phone]  Dept Fax: [phone]  Loc: [phone]    Arabella Causey is a 42 y.o. female who was referred by No ref. provider found for:  Chief Complaint   Patient presents with    Follow-up     Patient here for follow up for ear infection after being seen at Urgent Care.  Otitis Externa   . HPI:     Patient presents for urgent care follow up of bilateral ear infections. Patient was seen in [facility] on 6/14/21 for 1 day history of bilateral ear pain with otorrhea. She was diagnosed with bilateral otitis externa with infective left otitis media and prescribed Augmentin with Ciprodex. A culture was obtained of the drainage. The patient reports that several days later she was contacted and told she was growing a staph infection and the oral antibiotics and ear drops were modified. She just completed the course of oral doxycycline and has continued to use the gentamicin drops. She reports that her symptoms have resolved and is back to baseline. No further otorrhea or otalgia. She was having muffled hearing with the infection recently, but that resolved. She states that she has had recurrent infections like this for several years and feels like it may have gradually decreased her hearing over the years. She does report intermittent tinnitus as well. She started using the 50/50 mixture of rubbing alcohol and white distilled vinegar when ear discomfort started, but has not been using it recurrently to help prevent recurrence of infections. She denies a history of recurrent ear infections as a child and never had tubes. She puts cotton balls in her ears before showering to prevent water from entering. She denies any other symptoms or concerns at this time.       Subjective:      REVIEW OF SYSTEMS:    A complete multi-organ review of systems was performed using a new patient questionnaire, and reviewed by me. ENT:  negative except as noted in HPI  CONSTITUTIONAL:  negative except as noted in HPI  EYES:  negative except as noted in HPI  RESPIRATORY:  negative except as noted in HPI  CARDIOVASCULAR:  negative except as noted in HPI  GASTROINTESTINAL:  negative except as noted in HPI  GENITOURINARY:  negative except as noted in HPI  MUSCULOSKELETAL:  negative except as noted in HPI  SKIN:  negative except as noted in HPI  ENDOCRINE/METABOLIC: negative except as noted in HPI  HEMATOLOGIC/LYMPHATIC:  negative except as noted in HPI  ALLERGY/IMMUN: negative except as noted in HPI  NEUROLOGICAL:  negative except as noted in HPI  BEHAVIOR/PSYCH:  negative except as noted in HPI    Past Medical History:  Past Medical History:   Diagnosis Date    GERD (gastroesophageal reflux disease)     Vertigo        Social History:    TOBACCO:   reports that she has been smoking cigarettes. She has been smoking about 0.50 packs per day. She has never used smokeless tobacco.  ETOH:   reports no history of alcohol use. DRUGS:   reports no history of drug use. Family History:       Problem Relation Age of Onset    Diabetes Mother     Stroke Mother     High Blood Pressure Mother     High Cholesterol Mother     Heart Disease Mother     No Known Problems Father     High Blood Pressure Sister     No Known Problems Brother     Early Death Sister 32        meningitis    No Known Problems Brother     No Known Problems Brother        Surgical History:  Past Surgical History:   Procedure Laterality Date    TUBAL LIGATION          Objective: This is a 42 y.o. female. Patient is alert and oriented to person, place and time. Patient appears well developed, well nourished. Mood is happy with normal affect. Not obviously hearing impaired. No abnormality in speech noted.     /76 (Site: Right Upper Arm, Position: Sitting)   Pulse 68   Temp 97.2 °F (36.2 °C) (Infrared)   Resp 12   Ht 5' 8\" (1.727 m)   Wt 249 lb 1.6 oz (113 kg)   LMP 06/10/2021   BMI 37.88 kg/m²     Head:   Normocephalic, atraumatic. No obvious masses or lesions noted. Ears:  External ears: Normal: no scars, lesions or masses. Mastoid process: No erythema noted. No tenderness to palpation. R External auditory canal: Canal mildly erythematous throughout, but no obvious drainage or canal edema noted. Canal otherwise clear and free of any pathology  L External auditory canal: Canal mildly erythematous throughout, but no obvious drainage or canal edema noted. Canal otherwise clear and free of any pathology   Tympanic membranes:  R Mildly erythematous, but no evidence of middle ear fluid                                                  L Mildly erythematous, but no evidence of middle ear fluid     Tuning [place]:   Rinne:  Right Ear:  512hz - Result positive (air conduction greater than bone conduction)               Left Ear:  [id number] - Result positive (air conduction greater than bone conduction)  Shetty: [id number]. Result - lateralizes to the right    Neck: Trachea midline. Thyroid not enlarged, no palpable masses or tenderness. Lymphatic: No cervical lymphadenopathy noted. Eyes: SHIMON, EOM intact. Conjunctiva moist without discharge. Lungs: Normal effort of breathing, not obviously distressed. Neuro: Cranial nerves II-XII grossly intact. Extremities: No clubbing, edema, or cyanosis noted. Data:    Ear culture from  visit 6/14/21  Susceptibility    Staphylococcus aureus (1)    Antibiotic Interpretation ANA Status    gentamicin Sensitive <=0.5 mcg/mL Final    ciprofloxacin Resistant >=8 mcg/mL Final    ICR (D test) Positive Pos  mcg/mL Final     (Dtest) ICR- inducible clinda resistance    If +, then inducible erm gene       present.  Clindamycin may be       effective in some patients.       erythromycin Resistant <=0.25 mcg/mL Final    oxacillin Resistant >=4 mcg/mL Final    clindamycin Resistant <=0.25 mcg/mL Final trimethoprim-sulfamethoxazole Resistant >=320 mcg/mL Final    linezolid Sensitive 2 mcg/mL Final    vancomycin Sensitive 1 mcg/mL Final    tetracycline Sensitive <=1 mcg/mL Final          Assessment/Plan:     Diagnosis Orders   1. Chronic otitis externa of both ears, unspecified type     2. Bilateral hearing loss, unspecified hearing loss type  Audiometry with tympanometry       The patient is a 42 y.o. female that presents for ER follow-up regarding bilateral otitis externa. Patient has no evidence on exam of persistent bilateral otitis externa and patient reports her symptoms of essentially resolved. I strongly recommended the patient complete her course of oral antibiotics and continue the antibiotic drops until the oral antibiotics are complete. I then recommended she begin using a 50-50 mixture of rubbing alcohol and white distilled vinegar to help prevent recurrence of the infection. I also strongly recommended she continue to prevent water entering her ear, as this can be a cause for recurrence of her infection. I requested that the patient contact her office if she does have a recurrence of her symptoms as I would like to see her when acutely ill. Both of the times I have seen the patient for this problem, her symptoms had resolved with treatment. Also recommended an audiogram to further assess her hearing since she does report a very gradual and mild decline in her hearing since she began to have recurrent ear issues several years ago. Will call with the audiogram results. The patient expresses understanding of the plan and thanked me. She will contact the office with new/worsening symptoms or other concerns.   She will otherwise follow-up as needed    (Please note that portions of this note may have been completed with a voice recognition program.  Efforts were made to edit the dictation but occasionally words are mis-transcribed.)    Electronically signed by AMAIRANI Rosas on 7/2/2021 at 1:28 PM

## 2021-07-01 NOTE — PATIENT INSTRUCTIONS
Start using 50/50 mixture of white distilled vinegar and rubbing alcohol. You should try to use this each day in both ears to help prevent recurrence of this infection.

## 2021-08-16 ENCOUNTER — TELEPHONE (OUTPATIENT)
Dept: ENT CLINIC | Age: 41
End: 2021-08-16

## 2021-08-16 ENCOUNTER — HOSPITAL ENCOUNTER (OUTPATIENT)
Dept: AUDIOLOGY | Age: 41
Discharge: HOME | End: 2021-08-16
Payer: COMMERCIAL

## 2021-08-16 DIAGNOSIS — H91.93 BILATERAL HEARING LOSS, UNSPECIFIED HEARING LOSS TYPE: Primary | ICD-10-CM

## 2021-08-16 PROCEDURE — 92557 COMPREHENSIVE HEARING TEST: CPT | Performed by: AUDIOLOGIST

## 2021-08-16 PROCEDURE — 92567 TYMPANOMETRY: CPT | Performed by: AUDIOLOGIST

## 2021-08-16 NOTE — TELEPHONE ENCOUNTER
I contacted the patient to discuss her audiogram results. Informed her that majority of her hearing is considered normal. However, in some of the higher frequencies she does become borderline normal to slightly below normal hearing thresholds. There is also some degree of asymmetry, but is mild and only significantly in one frequency level. I informed the patient that this is nothing that would require an MRI at this time, but I would like to recheck to make sure that it is not worsening. I recommended an audiogram in 1 year and same-day follow-up afterwards for those results. The patient is agreeable to this and will await a call from our office to schedule. Please contact the patient and schedule the audiogram and same-day follow-up in about 1 year. I also recommend the patient to contact our office sooner if she notices ear drainage, otalgia, worsening hearing, or any other symptoms/concerns. She expressed understanding of the plan and thanked me for the call.

## 2021-09-06 ENCOUNTER — HOSPITAL ENCOUNTER (EMERGENCY)
Age: 41
Discharge: HOME | End: 2021-09-06
Payer: COMMERCIAL

## 2021-09-06 VITALS
WEIGHT: 230 LBS | TEMPERATURE: 97.5 F | SYSTOLIC BLOOD PRESSURE: 122 MMHG | HEART RATE: 92 BPM | OXYGEN SATURATION: 98 % | BODY MASS INDEX: 34.97 KG/M2 | RESPIRATION RATE: 16 BRPM | DIASTOLIC BLOOD PRESSURE: 74 MMHG

## 2021-09-06 DIAGNOSIS — H60.503 ACUTE OTITIS EXTERNA OF BOTH EARS, UNSPECIFIED TYPE: Primary | ICD-10-CM

## 2021-09-06 DIAGNOSIS — J06.9 UPPER RESPIRATORY TRACT INFECTION, UNSPECIFIED TYPE: ICD-10-CM

## 2021-09-06 LAB — SARS-COV-2, NAA: NOT  DETECTED

## 2021-09-06 PROCEDURE — 87635 SARS-COV-2 COVID-19 AMP PRB: CPT

## 2021-09-06 PROCEDURE — 99214 OFFICE O/P EST MOD 30 MIN: CPT | Performed by: NURSE PRACTITIONER

## 2021-09-06 RX ORDER — OFLOXACIN 3 MG/ML
5 SOLUTION AURICULAR (OTIC) 3 TIMES DAILY
Qty: 7.5 ML | Refills: 0 | Status: SHIPPED | OUTPATIENT
Start: 2021-09-06 | End: 2021-09-16

## 2021-09-06 RX ORDER — ACETAMINOPHEN, DEXTROMETHORPHAN HYDROBROMIDE, GUAIFENESIN, AND PHENYLEPHRINE HYDROCHLORIDE 325; 10; 200; 5 MG/1; MG/1; MG/1; MG/1
CAPSULE, LIQUID FILLED ORAL
COMMUNITY
End: 2022-06-01

## 2021-09-06 RX ORDER — FLUTICASONE PROPIONATE 50 MCG
1 SPRAY, SUSPENSION (ML) NASAL DAILY
Qty: 16 G | Refills: 0 | OUTPATIENT
Start: 2021-09-06 | End: 2022-06-01

## 2021-09-06 RX ORDER — LORATADINE 10 MG/1
10 TABLET ORAL DAILY
Qty: 30 TABLET | Refills: 0 | Status: SHIPPED | OUTPATIENT
Start: 2021-09-06 | End: 2022-06-01

## 2021-09-06 ASSESSMENT — ENCOUNTER SYMPTOMS
SHORTNESS OF BREATH: 0
VOMITING: 0
NAUSEA: 0
DIARRHEA: 0
SORE THROAT: 1
COUGH: 1

## 2021-09-06 ASSESSMENT — PAIN DESCRIPTION - LOCATION: LOCATION: THROAT;HEAD

## 2021-09-06 ASSESSMENT — PAIN SCALES - GENERAL: PAINLEVEL_OUTOF10: 5

## 2021-09-06 ASSESSMENT — PAIN - FUNCTIONAL ASSESSMENT: PAIN_FUNCTIONAL_ASSESSMENT: PREVENTS OR INTERFERES SOME ACTIVE ACTIVITIES AND ADLS

## 2021-09-06 ASSESSMENT — PAIN DESCRIPTION - DESCRIPTORS: DESCRIPTORS: ACHING;DISCOMFORT

## 2021-09-06 ASSESSMENT — PAIN DESCRIPTION - PAIN TYPE: TYPE: ACUTE PAIN

## 2021-09-06 ASSESSMENT — PAIN DESCRIPTION - FREQUENCY: FREQUENCY: CONTINUOUS

## 2021-09-06 NOTE — ED TRIAGE NOTES
Patient wants covid testing, exposure, productive cough, yellow mucus, headache, runny nose, sore throat. mucinex taken. Patient ambulated to negative pressure room, nasal swab obtained, labeled, taken to l ab, tolerated well. Nurse wearing PPE.

## 2021-09-06 NOTE — ED PROVIDER NOTES
[facility]  Urgent Care Encounter       CHIEF COMPLAINT       Chief Complaint   Patient presents with    Cough    Nasal Congestion    Pharyngitis    Covid Testing     headache       Nurses Notes reviewed and I agree except as noted in the HPI. HISTORY OF PRESENT ILLNESS   Jolene Ernst is a 42 y.o. female who presents for evaluation of cough, congestion, sore throat and ear pain. Patient states that the symptoms have been ongoing for the past 2 days. She states that she is scheduled to see an ENT tomorrow due to history of frequent ear infections. She denies any fevers or loss of smell or taste. States that she has been exposed to Covid 4 days ago. She denies any chest tightness or shortness of breath. The history is provided by the patient. REVIEW OF SYSTEMS     Review of Systems   Constitutional: Negative for chills and fever. HENT: Positive for congestion, ear pain and sore throat. Respiratory: Positive for cough. Negative for shortness of breath. Cardiovascular: Negative for chest pain. Gastrointestinal: Negative for diarrhea, nausea and vomiting. Musculoskeletal: Negative for arthralgias and myalgias. Skin: Negative for rash. Allergic/Immunologic: Positive for environmental allergies. Neurological: Negative for headaches. PAST MEDICAL HISTORY         Diagnosis Date    GERD (gastroesophageal reflux disease)     Vertigo        SURGICALHISTORY     Patient  has a past surgical history that includes Tubal ligation. CURRENT MEDICATIONS       Previous Medications    ACETAMINOPHEN (TYLENOL) 500 MG TABLET    Take 1,000 mg by mouth every 6 hours as needed for Pain    IBUPROFEN (ADVIL;MOTRIN) 400 MG TABLET    Take 1 tablet by mouth every 8 hours as needed for Pain    PHENYLEPHRINE-DM-GG-APAP (MUCINEX FAST-MAX) 5--325 MG CAPS    Take by mouth       ALLERGIES     Patient is has No Known Allergies.     Patients   There is no immunization history on file for this patient. FAMILY HISTORY     Patient's family history includes Diabetes in her mother; Early Death (age of onset: 32) in her sister; Heart Disease in her mother; High Blood Pressure in her mother and sister; High Cholesterol in her mother; No Known Problems in her brother, brother, brother, and father; Stroke in her mother. SOCIAL HISTORY     Patient  reports that she has been smoking cigarettes. She has been smoking about 0.50 packs per day. She has never used smokeless tobacco. She reports that she does not drink alcohol and does not use drugs. PHYSICAL EXAM     ED TRIAGE VITALS  BP: 122/74, Temp: 97.5 °F (36.4 °C), Pulse: 92, Resp: 16, SpO2: 98 %,Estimated body mass index is 34.97 kg/m² as calculated from the following:    Height as of 7/1/21: 5' 8\" (1.727 m). Weight as of this encounter: 230 lb (104.3 kg). ,Patient's last menstrual period was 09/06/2021. Physical Exam  Vitals and nursing note reviewed. Constitutional:       General: She is not in acute distress. Appearance: She is well-developed. She is not diaphoretic. HENT:      Right Ear: Drainage, swelling and tenderness present. Left Ear: Drainage, swelling and tenderness present. Mouth/Throat:      Mouth: Mucous membranes are moist.      Pharynx: Oropharynx is clear. Posterior oropharyngeal erythema present. Comments: Small, blisters noted to posterior oropharynx  Eyes:      Conjunctiva/sclera:      Right eye: Right conjunctiva is not injected. Left eye: Left conjunctiva is not injected. Pupils: Pupils are equal.   Cardiovascular:      Rate and Rhythm: Normal rate and regular rhythm. Heart sounds: No murmur heard. Pulmonary:      Effort: Pulmonary effort is normal. No respiratory distress. Breath sounds: Normal breath sounds. Musculoskeletal:      Cervical back: Normal range of motion. Right knee: Normal range of motion. Left knee: Normal range of motion. Lymphadenopathy:      Head:      Right side of head: No tonsillar adenopathy. Left side of head: No tonsillar adenopathy. Cervical: No cervical adenopathy. Skin:     General: Skin is warm. Findings: No rash. Neurological:      Mental Status: She is alert and oriented to person, place, and time. Psychiatric:         Behavior: Behavior normal.         DIAGNOSTIC RESULTS     Labs:  Results for orders placed or performed during the hospital encounter of 09/06/21   COVID-19, Rapid   Result Value Ref Range    SARS-CoV-2, ANITA NOT  DETECTED NOT DETECTED       IMAGING:    No orders to display         EKG: none      URGENT CARE COURSE:     Vitals:    09/06/21 1136   BP: 122/74   Pulse: 92   Resp: 16   Temp: 97.5 °F (36.4 °C)   TempSrc: Temporal   SpO2: 98%   Weight: 230 lb (104.3 kg)       Medications - No data to display         PROCEDURES:  None    FINAL IMPRESSION      1. Acute otitis externa of both ears, unspecified type    2. Upper respiratory tract infection, unspecified type          DISPOSITION/ PLAN     I discussed with the patient that exam is consistent with a viral type upper respiratory infection, however she does appear to have otitis externa. She is placed on topical antibiotic drops and will begin Claritin and Flonase for the respiratory symptoms. She is negative for Covid at this time and is advised to follow-up with her ENT tomorrow as scheduled. She is agreeable to plan as discussed.       PATIENT REFERRED TO:  Jackie Murphy, APRN - CNP  [address] / [facility] [place]      DISCHARGE MEDICATIONS:  New Prescriptions    FLUTICASONE (FLONASE) 50 MCG/ACT NASAL SPRAY    1 spray by Each Nostril route daily    LORATADINE (CLARITIN) 10 MG TABLET    Take 1 tablet by mouth daily    OFLOXACIN (FLOXIN) 0.3 % OTIC SOLUTION    Place 5 drops into both ears 3 times daily for 10 days       Discontinued Medications    DOXYCYCLINE HYCLATE (VIBRAMYCIN) 100 MG CAPSULE    take 1 capsule by mouth twice a day for 10 days    GENTAMICIN (GARAMYCIN) 0.3 % OPHTHALMIC SOLUTION    instill 3 drops into each ear three times a day for 7 days    MAGNESIUM SULFATE, LAXATIVE, (EPSOM SALT) GRAN    Follow package directions for topical use. MENTHOL-CAMPHOR (TIGER BALM EXTRA STRENGTH) 11-10 % OINT    Follow package directions for topical use.        Current Discharge Medication List          SONNY Soriano CNP    (Please note that portions of this note were completed with a voice recognition program. Efforts were made to edit the dictations but occasionally words are mis-transcribed.)          SONNY Soriano CNP  09/06/21 6755

## 2021-09-07 ENCOUNTER — OFFICE VISIT (OUTPATIENT)
Dept: ENT CLINIC | Age: 41
End: 2021-09-07
Payer: COMMERCIAL

## 2021-09-07 VITALS
TEMPERATURE: 97.1 F | WEIGHT: 249 LBS | RESPIRATION RATE: 12 BRPM | SYSTOLIC BLOOD PRESSURE: 112 MMHG | DIASTOLIC BLOOD PRESSURE: 68 MMHG | BODY MASS INDEX: 37.86 KG/M2 | HEART RATE: 84 BPM

## 2021-09-07 DIAGNOSIS — H60.313 ACUTE DIFFUSE OTITIS EXTERNA OF BOTH EARS: Primary | ICD-10-CM

## 2021-09-07 PROCEDURE — G8417 CALC BMI ABV UP PARAM F/U: HCPCS | Performed by: NURSE PRACTITIONER

## 2021-09-07 PROCEDURE — 4004F PT TOBACCO SCREEN RCVD TLK: CPT | Performed by: NURSE PRACTITIONER

## 2021-09-07 PROCEDURE — 99214 OFFICE O/P EST MOD 30 MIN: CPT | Performed by: NURSE PRACTITIONER

## 2021-09-07 PROCEDURE — G8427 DOCREV CUR MEDS BY ELIG CLIN: HCPCS | Performed by: NURSE PRACTITIONER

## 2021-09-07 PROCEDURE — 4130F TOPICAL PREP RX AOE: CPT | Performed by: NURSE PRACTITIONER

## 2021-09-07 RX ORDER — GENTAMICIN SULFATE 3 MG/ML
SOLUTION/ DROPS OPHTHALMIC
Qty: 15 ML | Refills: 0 | Status: SHIPPED | OUTPATIENT
Start: 2021-09-07 | End: 2022-05-27 | Stop reason: ALTCHOICE

## 2021-09-07 RX ORDER — DOXYCYCLINE HYCLATE 100 MG
100 TABLET ORAL 2 TIMES DAILY
Qty: 28 TABLET | Refills: 0 | Status: SHIPPED | OUTPATIENT
Start: 2021-09-07 | End: 2021-09-21

## 2021-09-07 RX ORDER — MOMETASONE FUROATE 1 MG/G
CREAM TOPICAL
Qty: 15 G | Refills: 0 | Status: SHIPPED | OUTPATIENT
Start: 2021-09-07 | End: 2022-06-01

## 2021-09-07 NOTE — PROGRESS NOTES
[facility] [place] [facility]  [facility] EAR, NOSE AND THROAT  [facility] [place]  Dept: [phone]  Dept Fax: [phone]  Loc: [phone]    Eliel Panchal is a 42 y.o. female who was referred by No ref. provider found for:  Chief Complaint   Patient presents with   Venkattello Mijares Ear Problem     Patient is here for ear drainage. c/o both ear, clogged     HPI:     Eliel Panchal is a 42 y.o. female here for evaluation of ear infection. She reports bilateral ear itching, drainage, discomfort and decreased hearing over the last 4 days. Her ears feel clogged up. She went to ER yesterday and was given Ocuflox drops. She has had trouble with recurrent OE since June. Last seen with Stephen Coyle on 7/1 and infection had resolved. She reports that she has been keeping water out of the ears and using vinegar/alcohol, but ears continued to be itchy and she does scratch sometimes. Last ear culture grew S aureus resistant to cipro, oxacillin, clinda and sulfa. History:     No Known Allergies  Current Outpatient Medications   Medication Sig Dispense Refill    doxycycline hyclate (VIBRA-TABS) 100 MG tablet Take 1 tablet by mouth 2 times daily for 14 days 28 tablet 0    gentamicin (GARAMYCIN) 0.3 % ophthalmic solution Instill 4 drops into each ear two times a day 15 mL 0    mometasone (ELOCON) 0.1 % cream Apply topically daily to around ear opening.  15 g 0    Phenylephrine-DM-GG-APAP (MUCINEX FAST-MAX) 5--325 MG CAPS Take by mouth      ofloxacin (FLOXIN) 0.3 % otic solution Place 5 drops into both ears 3 times daily for 10 days 7.5 mL 0    loratadine (CLARITIN) 10 MG tablet Take 1 tablet by mouth daily 30 tablet 0    fluticasone (FLONASE) 50 MCG/ACT nasal spray 1 spray by Each Nostril route daily 16 g 0    acetaminophen (TYLENOL) 500 MG tablet Take 1,000 mg by mouth every 6 hours as needed for Pain      ibuprofen (ADVIL;MOTRIN) 400 MG tablet Take 1 tablet by mouth every 8 hours as needed for Pain 30 tablet 0     No current facility-administered medications for this visit. Past Medical History:   Diagnosis Date    GERD (gastroesophageal reflux disease)     Vertigo       Past Surgical History:   Procedure Laterality Date    TUBAL LIGATION       Family History   Problem Relation Age of Onset    Diabetes Mother     Stroke Mother     High Blood Pressure Mother     High Cholesterol Mother     Heart Disease Mother     No Known Problems Father     High Blood Pressure Sister     No Known Problems Brother     Early Death Sister 32        meningitis    No Known Problems Brother     No Known Problems Brother      Social History     Tobacco Use    Smoking status: Current Every Day Smoker     Packs/day: 0.50     Types: Cigarettes    Smokeless tobacco: Never Used   Substance Use Topics    Alcohol use: No        Subjective:      Review of Systems  Rest of review of systems are negative, except as noted in HPI. Objective:     /68 (Site: Right Upper Arm, Position: Sitting)   Pulse 84   Temp 97.1 °F (36.2 °C) (Infrared)   Resp 12   Wt 249 lb (112.9 kg)   LMP 09/06/2021   BMI 37.86 kg/m²     PHYSICAL EXAM  Constitutional: Oriented to person, place, and time. appears well-developed and well-nourished. No distress. HENT:   Head: Normocephalic and atraumatic. Right Ear:  External ear normal, aside from mild erythema/edema/scaling at meatus into conchal bowl. + otorrhea. Canal with tenderness, mild edema and moderate erythema. Drainage suctioned. Tympanic membrane intact. Middle ear aerated. Left Ear:  External ear normal, aside from mild erythema/edema/scaling at meatus into conchal bowl. + otorrhea. Canal with tenderness, mild edema and moderate erythema. Drainage suctioned. Culture obtained. Tympanic membrane intact. Middle ear aerated. Vitals reviewed.     Data:  All of the past medical history, past surgical history, family history,social history,

## 2021-09-09 LAB
AEROBIC CULTURE: NORMAL
GRAM STAIN RESULT: NORMAL

## 2021-11-15 ENCOUNTER — OFFICE VISIT (OUTPATIENT)
Dept: ENT CLINIC | Age: 41
End: 2021-11-15
Payer: COMMERCIAL

## 2021-11-15 ENCOUNTER — TELEPHONE (OUTPATIENT)
Dept: ENT CLINIC | Age: 41
End: 2021-11-15

## 2021-11-15 VITALS
BODY MASS INDEX: 36.74 KG/M2 | OXYGEN SATURATION: 100 % | TEMPERATURE: 98.2 F | WEIGHT: 242.4 LBS | SYSTOLIC BLOOD PRESSURE: 127 MMHG | HEIGHT: 68 IN | DIASTOLIC BLOOD PRESSURE: 79 MMHG | RESPIRATION RATE: 14 BRPM | HEART RATE: 109 BPM

## 2021-11-15 DIAGNOSIS — H92.13 PURULENT OTORRHEA OF BOTH EARS: Primary | ICD-10-CM

## 2021-11-15 DIAGNOSIS — H60.63 CHRONIC OTITIS EXTERNA OF BOTH EARS, UNSPECIFIED TYPE: ICD-10-CM

## 2021-11-15 DIAGNOSIS — H60.543 ECZEMA OF BOTH EXTERNAL EARS: ICD-10-CM

## 2021-11-15 DIAGNOSIS — R42 DIZZINESS: ICD-10-CM

## 2021-11-15 PROCEDURE — G8417 CALC BMI ABV UP PARAM F/U: HCPCS | Performed by: PHYSICIAN ASSISTANT

## 2021-11-15 PROCEDURE — G8484 FLU IMMUNIZE NO ADMIN: HCPCS | Performed by: PHYSICIAN ASSISTANT

## 2021-11-15 PROCEDURE — 4130F TOPICAL PREP RX AOE: CPT | Performed by: PHYSICIAN ASSISTANT

## 2021-11-15 PROCEDURE — G8427 DOCREV CUR MEDS BY ELIG CLIN: HCPCS | Performed by: PHYSICIAN ASSISTANT

## 2021-11-15 PROCEDURE — 99213 OFFICE O/P EST LOW 20 MIN: CPT | Performed by: PHYSICIAN ASSISTANT

## 2021-11-15 PROCEDURE — 4004F PT TOBACCO SCREEN RCVD TLK: CPT | Performed by: PHYSICIAN ASSISTANT

## 2021-11-15 RX ORDER — CIPROFLOXACIN HYDROCHLORIDE 3.5 MG/ML
4 SOLUTION/ DROPS TOPICAL 2 TIMES DAILY
Qty: 10 ML | Refills: 1 | Status: SHIPPED | OUTPATIENT
Start: 2021-11-15 | End: 2022-05-27 | Stop reason: ALTCHOICE

## 2021-11-15 NOTE — TELEPHONE ENCOUNTER
Patient called in stated she is having a yellow discharge from both ears, muffled hearing and has been dizzy (no fever). Started 2 days ago, woke up this morning and it has gotten worse. She is wanting an appointment today or she is going to urgent care.     Please advise

## 2021-11-15 NOTE — PROGRESS NOTES
[facility] [place] [facility]  [facility] EAR, NOSE AND THROAT  [facility] [place]  Dept: [phone]  Dept Fax: [phone]  Loc: [phone]    Miriam Matta is a 42 y.o. female who was referred by No ref. provider found for:  Chief Complaint   Patient presents with    Follow-up     Ear drainage, dizzy   . HPI:     Patient presents for evaluation of bilateral ear drainage. She states that the last few days she is noticing bilateral, yellow otorrhea. She states that her head \"feels funny\" and her ears feel muffled. She states that she was originally having some mild otalgia, but that has resolved the last few days. She denies any fevers, chills, tinnitus. She states that she felt slightly lightheaded yesterday, but this happen when she was getting out of bed or standing from a seated position. Dizziness was self-limiting and resolved within a few seconds. Patient has been using her home mixture of eardrops with 50-50 rubbing alcohol and peroxide. She denies any recent water exposure to the ears that she is aware of. Patient reports that her external ears get dry and crusted when she has a recurrence of her ear infections. She denies any other symptoms or concerns at this time. Subjective:      REVIEW OF SYSTEMS:    A complete multi-organ review of systems was performed using a new patient questionnaire, and reviewed by me.   ENT:  negative except as noted in HPI  CONSTITUTIONAL:  negative except as noted in HPI  EYES:  negative except as noted in HPI  RESPIRATORY:  negative except as noted in HPI  CARDIOVASCULAR:  negative except as noted in HPI  GASTROINTESTINAL:  negative except as noted in HPI  GENITOURINARY:  negative except as noted in HPI  MUSCULOSKELETAL:  negative except as noted in HPI  SKIN:  negative except as noted in HPI  ENDOCRINE/METABOLIC: negative except as noted in HPI  HEMATOLOGIC/LYMPHATIC:  negative except as noted in HPI  ALLERGY/IMMUN: negative except as noted in HPI  NEUROLOGICAL:  negative except as noted in HPI  BEHAVIOR/PSYCH:  negative except as noted in HPI    Past Medical History:  Past Medical History:   Diagnosis Date    GERD (gastroesophageal reflux disease)     Vertigo        Social History:    TOBACCO:   reports that she has been smoking cigarettes. She has been smoking about 0.50 packs per day. She has never used smokeless tobacco.  ETOH:   reports no history of alcohol use. DRUGS:   reports no history of drug use. Family History:       Problem Relation Age of Onset    Diabetes Mother     Stroke Mother     High Blood Pressure Mother     High Cholesterol Mother     Heart Disease Mother     No Known Problems Father     High Blood Pressure Sister     No Known Problems Brother     Early Death Sister 32        meningitis    No Known Problems Brother     No Known Problems Brother        Surgical History:  Past Surgical History:   Procedure Laterality Date    TUBAL LIGATION          Objective: This is a 42 y.o. female. Patient is alert and oriented to person, place and time. Patient appears well developed, well nourished. Mood is happy with normal affect. Not obviously hearing impaired. No abnormality in speech noted. /79 (Site: Left Wrist, Position: Sitting)   Pulse 109   Temp 98.2 °F (36.8 °C) (Infrared)   Resp 14   Ht 5' 8\" (1.727 m)   Wt 242 lb 6.4 oz (110 kg)   SpO2 100%   BMI 36.86 kg/m²     Head:   Normocephalic, atraumatic. No obvious masses or lesions noted. Ears:  External ears: dry, eczematous appearing skin of the external ear near the meatus and mild amount of dried secretions near the meatus bilaterally as well. Mastoid process: No erythema noted. No tenderness to palpation. R External auditory canal: Moderate amount of purulent secretions throughout the EAC. Canal is somewhat erythematous appearing, but not edematous. Culture was obtained of the drainage. Majority of the lateralized drainage was removed using suction under handheld magnification. There was a moderate amount of drainage overlying the TM. I was able to remove most of the drainage overlying the TM as well. TM was mildly erythematous. No TM perforation visible, no evidence of middle ear pathology with limited visualization. L External auditory canal: Moderate amount of purulent secretions throughout the EAC. Canal is somewhat erythematous appearing, but not edematous. Majority of the lateralized drainage was removed using suction under handheld magnification. There was a moderate amount of drainage overlying the TM. I was able to remove most of the drainage overlying the TM as well. TM was mildly erythematous. No TM perforation visible, no evidence of middle ear pathology with limited visualization. Tympanic membranes:  R limited visualization due to drainage, but visualized portions are mildly erythematous. No obvious TM perforation or middle ear pathology noted. L limited visualization due to drainage, but visualized portions are mildly erythematous. No obvious TM perforation or middle ear pathology noted. Assessment/Plan:     Diagnosis Orders   1. Purulent otorrhea of both ears  ciprofloxacin (CILOXAN) 0.3 % ophthalmic solution    Culture, Ear   2. Chronic otitis externa of both ears, unspecified type     3. Eczema of both external ears     4. Dizziness         The patient is a 42 y.o. female that presents for evaluation of bilateral ear drainage. Patient does appear to have bilateral otitis externa, which is likely recurrence of her chronic ear issues. Culture of the purulent drainage was obtained and majority of the remained during drainage was removed using suction under handheld magnification.   Patient did report some mild improvement in her ear muffling with removal.  Patient will be started on Ciloxan drops and may add oral antibiotics or modify topical antibiotics pending culture results. Water precautions were discussed. Patient will tentatively be scheduled to follow up in 1 week. If oral antibiotics are added, her follow up will likely be pushed out until 11/29/21. I informed the patient that her dizziness seems to be more consistent with orthostatic is likely related to her ears at this time. Further work-up will be considered once infection has been cleared. Patient expressed understanding and thanked me.      (Please note that portions of this note may have been completed with a voice recognition program.  Efforts were made to edit the dictation but occasionally words are mis-transcribed.)    Electronically signed by AMAIRANI Gu on 11/15/2021 at 12:02 PM

## 2021-11-18 LAB
AEROBIC CULTURE: ABNORMAL
AEROBIC CULTURE: ABNORMAL
GRAM STAIN RESULT: ABNORMAL
ORGANISM: ABNORMAL
ORGANISM: ABNORMAL

## 2022-01-06 ENCOUNTER — HOSPITAL ENCOUNTER (EMERGENCY)
Age: 42
Discharge: HOME | End: 2022-01-06
Payer: COMMERCIAL

## 2022-01-06 VITALS
HEIGHT: 68 IN | DIASTOLIC BLOOD PRESSURE: 66 MMHG | OXYGEN SATURATION: 97 % | SYSTOLIC BLOOD PRESSURE: 149 MMHG | BODY MASS INDEX: 33.34 KG/M2 | WEIGHT: 220 LBS | TEMPERATURE: 97.1 F | HEART RATE: 106 BPM | RESPIRATION RATE: 16 BRPM

## 2022-01-06 DIAGNOSIS — F17.200 TOBACCO DEPENDENCY: ICD-10-CM

## 2022-01-06 DIAGNOSIS — J40 BRONCHITIS: ICD-10-CM

## 2022-01-06 DIAGNOSIS — H65.93 BILATERAL OTITIS MEDIA WITH EFFUSION: Primary | ICD-10-CM

## 2022-01-06 LAB — SARS-COV-2, NAA: NOT  DETECTED

## 2022-01-06 PROCEDURE — 87635 SARS-COV-2 COVID-19 AMP PRB: CPT

## 2022-01-06 PROCEDURE — 99213 OFFICE O/P EST LOW 20 MIN: CPT

## 2022-01-06 PROCEDURE — 99213 OFFICE O/P EST LOW 20 MIN: CPT | Performed by: NURSE PRACTITIONER

## 2022-01-06 RX ORDER — ALBUTEROL SULFATE 90 UG/1
2 AEROSOL, METERED RESPIRATORY (INHALATION) EVERY 4 HOURS PRN
Qty: 18 G | Refills: 0 | Status: SHIPPED | OUTPATIENT
Start: 2022-01-06

## 2022-01-06 RX ORDER — PREDNISONE 20 MG/1
20 TABLET ORAL 2 TIMES DAILY
Qty: 10 TABLET | Refills: 0 | Status: SHIPPED | OUTPATIENT
Start: 2022-01-06 | End: 2022-01-11

## 2022-01-06 RX ORDER — AMOXICILLIN AND CLAVULANATE POTASSIUM 875; 125 MG/1; MG/1
1 TABLET, FILM COATED ORAL 2 TIMES DAILY
Qty: 20 TABLET | Refills: 0 | Status: SHIPPED | OUTPATIENT
Start: 2022-01-06 | End: 2022-01-16

## 2022-01-06 ASSESSMENT — ENCOUNTER SYMPTOMS
SORE THROAT: 1
RHINORRHEA: 0
TROUBLE SWALLOWING: 0
COUGH: 1
NAUSEA: 0
CHEST TIGHTNESS: 1
DIARRHEA: 0
VOMITING: 0
SINUS CONGESTION: 1
SINUS PRESSURE: 0
BACK PAIN: 0
SHORTNESS OF BREATH: 0

## 2022-01-06 NOTE — ED PROVIDER NOTES
[place] [id number]  Urgent Care Encounter       CHIEF COMPLAINT       Chief Complaint   Patient presents with    Cough     onset 1/5/22 denies previous covid infection or vaccination.  Nasal Congestion    Chest Pain     chest \"hurts\" when coughing       Nurses Notes reviewed and I agree except as noted in the HPI. HISTORY OF PRESENT ILLNESS   Nivia Strong is a 42 y.o. female who presents to the urgent care center complaining of sudden onset of cough that started yesterday. Patient denies any loss of taste or smell fever or chills. Patient also complains of nasal congestion. She does complain of pain across anterior chest when she coughs. Patient does have a history of tobacco dependency has not had any Covid vaccinations. At present time patient sitting on table skin is warm and dry and does not appear to be in any acute distress. See HPI template. The history is provided by the patient. No  was used. Cough  Cough characteristics:  Productive  Sputum characteristics:  Yellow  Severity:  Mild  Onset quality:  Sudden  Duration:  1 day  Timing:  Intermittent  Progression:  Waxing and waning  Chronicity:  New  Smoker: yes    Context: not sick contacts    Worsened by:  Nothing  Ineffective treatments:  Cough suppressants and decongestant (DayQuil)  Associated symptoms: sinus congestion and sore throat    Associated symptoms: no chest pain, no chills, no ear pain, no fever, no headaches, no rash, no rhinorrhea and no shortness of breath    Sore throat:     Severity:  Mild    Onset quality:  Sudden    Duration:  2 days    Timing:  Intermittent    Progression:  Waxing and waning      REVIEW OF SYSTEMS     Review of Systems   Constitutional: Negative for activity change, appetite change, chills, fatigue and fever. HENT: Positive for congestion and sore throat. Negative for ear pain, rhinorrhea, sinus pressure and trouble swallowing.     Respiratory: Positive for cough and chest tightness. Negative for shortness of breath. Cardiovascular: Negative for chest pain. Gastrointestinal: Negative for diarrhea, nausea and vomiting. Musculoskeletal: Negative for back pain and neck stiffness. Skin: Negative for rash. Allergic/Immunologic: Positive for environmental allergies. Neurological: Negative for dizziness, light-headedness and headaches. Hematological: Negative for adenopathy. PAST MEDICAL HISTORY         Diagnosis Date    GERD (gastroesophageal reflux disease)     Vertigo        SURGICALHISTORY     Patient  has a past surgical history that includes Tubal ligation. CURRENT MEDICATIONS       Previous Medications    ACETAMINOPHEN (TYLENOL) 500 MG TABLET    Take 1,000 mg by mouth every 6 hours as needed for Pain    CIPROFLOXACIN (CILOXAN) 0.3 % OPHTHALMIC SOLUTION    Place 4 drops in ear(s) 2 times daily Both ears, keep instilled ear up for 10 min    FLUTICASONE (FLONASE) 50 MCG/ACT NASAL SPRAY    1 spray by Each Nostril route daily    GENTAMICIN (GARAMYCIN) 0.3 % OPHTHALMIC SOLUTION    Instill 4 drops into each ear two times a day    IBUPROFEN (ADVIL;MOTRIN) 400 MG TABLET    Take 1 tablet by mouth every 8 hours as needed for Pain    LORATADINE (CLARITIN) 10 MG TABLET    Take 1 tablet by mouth daily    MOMETASONE (ELOCON) 0.1 % CREAM    Apply topically daily to around ear opening. PHENYLEPHRINE-DM-GG-APAP ([place] [address] FAST-MAX) 5--325 MG CAPS    Take by mouth       ALLERGIES     Patient is has No Known Allergies. Patients   There is no immunization history on file for this patient. FAMILY HISTORY     Patient's family history includes Diabetes in her mother; Early Death (age of onset: 32) in her sister; Heart Disease in her mother; High Blood Pressure in her mother and sister; High Cholesterol in her mother; No Known Problems in her brother, brother, brother, and father; Stroke in her mother.     SOCIAL HISTORY     Patient  reports that she has been smoking cigarettes. She has been smoking about 0.50 packs per day. She has never used smokeless tobacco. She reports that she does not drink alcohol and does not use drugs. PHYSICAL EXAM     ED TRIAGE VITALS  BP: (!) 149/66, Temp: 97.1 °F (36.2 °C), Pulse: 106, Resp: 16, SpO2: 97 %,Estimated body mass index is 33.45 kg/m² as calculated from the following:    Height as of this encounter: 5' 8\" (1.727 m). Weight as of this encounter: 220 lb (99.8 kg). ,Patient's last menstrual period was 12/21/2021. Physical Exam  Vitals and nursing note reviewed. Constitutional:       General: She is not in acute distress. Appearance: Normal appearance. She is well-developed and well-groomed. She is not ill-appearing, toxic-appearing or diaphoretic. HENT:      Head: Normocephalic. Right Ear: Hearing, ear canal and external ear normal. No drainage, swelling or tenderness. A middle ear effusion is present. No mastoid tenderness. Tympanic membrane is erythematous. Left Ear: Hearing, ear canal and external ear normal. No drainage, swelling or tenderness. A middle ear effusion is present. No mastoid tenderness. Tympanic membrane is erythematous. Ears:      Comments: slight     Nose: Congestion present. No rhinorrhea. Right Sinus: No maxillary sinus tenderness or frontal sinus tenderness. Left Sinus: No maxillary sinus tenderness or frontal sinus tenderness. Mouth/Throat:      Lips: Pink. Mouth: Mucous membranes are moist.      Pharynx: Uvula midline. No pharyngeal swelling, oropharyngeal exudate, posterior oropharyngeal erythema or uvula swelling. Tonsils: No tonsillar exudate or tonsillar abscesses. Eyes:      Conjunctiva/sclera: Conjunctivae normal.      Pupils: Pupils are equal, round, and reactive to light. Cardiovascular:      Rate and Rhythm: Regular rhythm. Tachycardia present. Heart sounds: Normal heart sounds.    Pulmonary:      Effort: Pulmonary effort is normal. No accessory muscle usage. Breath sounds: Examination of the right-lower field reveals wheezing. Examination of the left-lower field reveals wheezing. Wheezing present. No decreased breath sounds, rhonchi or rales. Chest:   Breasts:      Right: No supraclavicular adenopathy. Left: No supraclavicular adenopathy. Abdominal:      General: Bowel sounds are normal.      Palpations: Abdomen is soft. Tenderness: There is no abdominal tenderness. There is no right CVA tenderness, left CVA tenderness or guarding. Negative signs include Perkins's sign. Musculoskeletal:      Cervical back: Full passive range of motion without pain and normal range of motion. No rigidity. No muscular tenderness. Normal range of motion. Lymphadenopathy:      Head:      Right side of head: No submental, submandibular, tonsillar, preauricular, posterior auricular or occipital adenopathy. Left side of head: No submental, submandibular, tonsillar, preauricular, posterior auricular or occipital adenopathy. Cervical: No cervical adenopathy. Right cervical: No superficial, deep or posterior cervical adenopathy. Left cervical: No superficial, deep or posterior cervical adenopathy. Upper Body:      Right upper body: No supraclavicular adenopathy. Left upper body: No supraclavicular adenopathy. Skin:     General: Skin is warm and dry. Capillary Refill: Capillary refill takes less than 2 seconds. Findings: No rash. Neurological:      Mental Status: She is alert and oriented to person, place, and time. Psychiatric:         Mood and Affect: Mood normal.         Behavior: Behavior normal. Behavior is cooperative.          DIAGNOSTIC RESULTS     Labs:  Results for orders placed or performed during the hospital encounter of 01/06/22   COVID-19, Rapid   Result Value Ref Range    SARS-CoV-2, ANITA NOT  DETECTED NOT DETECTED       IMAGING:    No orders to display         EKG:      URGENT CARE COURSE:     Vitals:    01/06/22 0931   BP: (!) 149/66   Pulse: 106   Resp: 16   Temp: 97.1 °F (36.2 °C)   TempSrc: Temporal   SpO2: 97%   Weight: 220 lb (99.8 kg)   Height: 5' 8\" (1.727 m)       Medications - No data to display         PROCEDURES:  None    FINAL IMPRESSION      1. Bilateral otitis media with effusion    2. Bronchitis    3. Tobacco dependency          DISPOSITION/ PLAN      The patient/Patient representative was advised to rest, drink lots of fluids, take Motrin and Tylenol for any fever, chills generalized body aches. The patient was also advised to monitor urine output for signs of dehydration. If the patient develops any chest pain, shortness of breath, neck pain or stiffness or abdominal pain or any other concerns, the patient is to call 911 or go to the emergency department for further evaluation. If the patient does not experience any of the above symptoms he is to follow-up with his primary care provider in 2-3 days for reevaluation. The patient/Patient representative are agreeable to the treatment plan at this time. The patient left the urgent care center in stable condition.           PATIENT REFERRED TO:  SONNY Ronquillo CNP  [address] / [facility]      DISCHARGE MEDICATIONS:  New Prescriptions    ALBUTEROL SULFATE  (90 BASE) MCG/ACT INHALER    Inhale 2 puffs into the lungs every 4 hours as needed for Wheezing or Shortness of Breath    AMOXICILLIN-CLAVULANATE (AUGMENTIN) 875-125 MG PER TABLET    Take 1 tablet by mouth 2 times daily for 10 days    PREDNISONE (DELTASONE) 20 MG TABLET    Take 1 tablet by mouth 2 times daily for 5 days       Discontinued Medications    No medications on file       Current Discharge Medication List          SONNY Oliveros CNP    (Please note that portions of this note were completed with a voice recognition program. Efforts were made to edit the dictations but occasionally words are mis-transcribed.)           Areta Glimpse [place] 73, APRYOANNA - CNP  01/06/22 0805

## 2022-01-06 NOTE — ED TRIAGE NOTES
C/O cough and runny nose x 2 days. States her chest hurts when she coughs. Denies hx of covid or covid vaccine. Rapid covid swab obtained and sent to lab. Pt tolerated.

## 2022-05-27 ENCOUNTER — HOSPITAL ENCOUNTER (EMERGENCY)
Age: 42
Discharge: HOME | End: 2022-05-27
Payer: COMMERCIAL

## 2022-05-27 VITALS
DIASTOLIC BLOOD PRESSURE: 74 MMHG | OXYGEN SATURATION: 98 % | SYSTOLIC BLOOD PRESSURE: 131 MMHG | HEART RATE: 78 BPM | RESPIRATION RATE: 18 BRPM | TEMPERATURE: 97.1 F

## 2022-05-27 DIAGNOSIS — H10.32 ACUTE BACTERIAL CONJUNCTIVITIS OF LEFT EYE: Primary | ICD-10-CM

## 2022-05-27 PROCEDURE — 99213 OFFICE O/P EST LOW 20 MIN: CPT | Performed by: NURSE PRACTITIONER

## 2022-05-27 PROCEDURE — 99213 OFFICE O/P EST LOW 20 MIN: CPT

## 2022-05-27 RX ORDER — MOXIFLOXACIN 5 MG/ML
1 SOLUTION/ DROPS OPHTHALMIC 3 TIMES DAILY
Qty: 1 EACH | Refills: 0 | Status: SHIPPED | OUTPATIENT
Start: 2022-05-27 | End: 2022-06-03

## 2022-05-27 ASSESSMENT — PAIN - FUNCTIONAL ASSESSMENT: PAIN_FUNCTIONAL_ASSESSMENT: NONE - DENIES PAIN

## 2022-05-27 ASSESSMENT — ENCOUNTER SYMPTOMS
COLOR CHANGE: 0
SINUS PRESSURE: 0
VOMITING: 0
DIARRHEA: 0
COUGH: 0
SHORTNESS OF BREATH: 0
BLURRED VISION: 0
SORE THROAT: 0
PHOTOPHOBIA: 0
EYE REDNESS: 1
EYE DISCHARGE: 0
EYE INFLAMMATION: 1

## 2022-05-27 NOTE — ED PROVIDER NOTES
[facility]  Urgent Care Encounter       CHIEF COMPLAINT       Chief Complaint   Patient presents with    Conjunctivitis     left eye,  worker x2 days        Nurses Notes reviewed and I agree except as noted in the HPI. HISTORY OF PRESENT ILLNESS   Finesse Morgan is a 36 y.o. female who presents to the urgent care center complaining of a thick drainage noted to the left eye this morning. Patient stated that her eye was matted shut. She did complain of some itching but denies any photophobia and sensation of foreign body or loss of central peripheral vision. She states that she does not wear any corrective lenses and denies any visual disturbances at the present time. The patient does have some soft tissue swelling noted to the upper and lower eyelid as well. She denies any nasal congestion ear pain sore throat or cough. Patient at the present time sitting on table she does not have any drainage noted at the present time to the eye. She states that she did clean the eye prior to arrival.  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 Patient denies any other complaints and does not appear to be in any acute distress. The history is provided by the patient. No  was used. Eye Problem  Location:  Left eye  Severity:  Mild  Onset quality:  Sudden  Timing:  Rare  Progression:  Unchanged  Chronicity:  New  Relieved by:  None tried  Worsened by:  Nothing  Ineffective treatments:  None tried  Associated symptoms: inflammation and redness    Associated symptoms: no blurred vision, no discharge, no headaches, no photophobia and no vomiting        REVIEW OF SYSTEMS     Review of Systems   Constitutional: Negative for chills and fever. HENT: Negative for congestion, ear pain, sinus pressure and sore throat. Eyes: Positive for redness. Negative for blurred vision, photophobia and discharge. mother; No Known Problems in her brother, brother, brother, and father; Stroke in her mother. SOCIAL HISTORY     Patient  reports that she has been smoking cigarettes. She has been smoking about 0.50 packs per day. She has never used smokeless tobacco. She reports that she does not drink alcohol and does not use drugs. PHYSICAL EXAM     ED TRIAGE VITALS  BP: 131/74, Temp: 97.1 °F (36.2 °C), Heart Rate: 78, Resp: 18, SpO2: 98 %,Estimated body mass index is 33.45 kg/m² as calculated from the following:    Height as of 1/6/22: 5' 8\" (1.727 m). Weight as of 1/6/22: 220 lb (99.8 kg). ,No LMP recorded. Physical Exam  Vitals and nursing note reviewed. Constitutional:       General: She is not in acute distress. Appearance: Normal appearance. She is well-developed. She is not ill-appearing, toxic-appearing or diaphoretic. HENT:      Head: Normocephalic. Right Ear: Tympanic membrane, ear canal and external ear normal. Tympanic membrane is not erythematous. Left Ear: Tympanic membrane, ear canal and external ear normal. Tympanic membrane is not erythematous. Nose: Nose normal. No congestion or rhinorrhea. Eyes:      General:         Left eye: Discharge present. No foreign body or hordeolum. Extraocular Movements: Extraocular movements intact. Conjunctiva/sclera:      Left eye: Left conjunctiva is injected. No chemosis or exudate. Pupils: Pupils are equal, round, and reactive to light. Cardiovascular:      Rate and Rhythm: Normal rate. Pulmonary:      Effort: Pulmonary effort is normal.   Musculoskeletal:         General: Normal range of motion. Cervical back: Full passive range of motion without pain, normal range of motion and neck supple. Skin:     General: Skin is warm and dry. Capillary Refill: Capillary refill takes less than 2 seconds. Neurological:      Mental Status: She is alert and oriented to person, place, and time.    Psychiatric:         Behavior: Behavior is cooperative. DIAGNOSTIC RESULTS     Labs:No results found for this visit on 05/27/22. IMAGING:    No orders to display         EKG:      URGENT CARE COURSE:     Vitals:    05/27/22 0915   BP: 131/74   Pulse: 78   Resp: 18   Temp: 97.1 °F (36.2 °C)   TempSrc: Tympanic   SpO2: 98%       Medications - No data to display         PROCEDURES:  None    FINAL IMPRESSION      1.  Acute bacterial conjunctivitis of left eye          DISPOSITION/ PLAN     Wash hands good  Wipe eyes from nose to ear  Monitor for any increase in redness, pain or drainage  Monitor any visual changes  No Contacts x 1 week if patient wear contacts  Follow up with PCP x 48 - 72 hours if no better       PATIENT REFERRED TO:  SONNY Vogel CNP  [address]  / [facility]      DISCHARGE MEDICATIONS:  Discharge Medication List as of 5/27/2022  9:27 AM      START taking these medications    Details   moxifloxacin (VIGAMOX) 0.5 % ophthalmic solution Place 1 drop into the left eye 3 times daily for 7 days, Disp-1 each, R-0Normal             Discharge Medication List as of 5/27/2022  9:27 AM          Discharge Medication List as of 5/27/2022  9:27 AM          SONNY Storey CNP    (Please note that portions of this note were completed with a voice recognition program. Efforts were made to edit the dictations but occasionally words are mis-transcribed.)           SONNY Storey CNP  05/27/22 5918

## 2022-06-01 ENCOUNTER — HOSPITAL ENCOUNTER (EMERGENCY)
Age: 42
Discharge: HOME | End: 2022-06-01
Payer: COMMERCIAL

## 2022-06-01 VITALS
HEART RATE: 92 BPM | RESPIRATION RATE: 16 BRPM | DIASTOLIC BLOOD PRESSURE: 74 MMHG | TEMPERATURE: 97.8 F | WEIGHT: 212 LBS | SYSTOLIC BLOOD PRESSURE: 124 MMHG | OXYGEN SATURATION: 98 % | BODY MASS INDEX: 32.23 KG/M2

## 2022-06-01 DIAGNOSIS — H10.12 ALLERGIC CONJUNCTIVITIS OF LEFT EYE: Primary | ICD-10-CM

## 2022-06-01 PROCEDURE — 99213 OFFICE O/P EST LOW 20 MIN: CPT

## 2022-06-01 PROCEDURE — 99213 OFFICE O/P EST LOW 20 MIN: CPT | Performed by: NURSE PRACTITIONER

## 2022-06-01 RX ORDER — AZELASTINE HYDROCHLORIDE 0.5 MG/ML
1 SOLUTION/ DROPS OPHTHALMIC 2 TIMES DAILY
Qty: 1 EACH | Refills: 0 | Status: SHIPPED | OUTPATIENT
Start: 2022-06-01 | End: 2022-07-01

## 2022-06-01 RX ORDER — PREDNISONE 20 MG/1
20 TABLET ORAL 2 TIMES DAILY
Qty: 10 TABLET | Refills: 0 | Status: SHIPPED | OUTPATIENT
Start: 2022-06-01 | End: 2022-06-06

## 2022-06-01 RX ORDER — DIPHENHYDRAMINE HCL 25 MG
25 CAPSULE ORAL NIGHTLY PRN
Qty: 30 CAPSULE | Refills: 0 | Status: SHIPPED | OUTPATIENT
Start: 2022-06-01 | End: 2022-06-11

## 2022-06-01 ASSESSMENT — ENCOUNTER SYMPTOMS
EYE DISCHARGE: 1
WHEEZING: 0
BLURRED VISION: 0
EYE ITCHING: 1
PHOTOPHOBIA: 0
NAUSEA: 0
APNEA: 0
COUGH: 1
BLIND SPOTS: 0
PERI-ORBITAL EDEMA: 0
EYE WATERING: 0
CHOKING: 0
CHEST TIGHTNESS: 0
VOMITING: 0
SHORTNESS OF BREATH: 0
EYE PAIN: 0
EYE REDNESS: 1
STRIDOR: 0
CRUSTING: 1
RHINORRHEA: 1
EYE INFLAMMATION: 1
DOUBLE VISION: 1

## 2022-06-01 ASSESSMENT — PAIN DESCRIPTION - ORIENTATION: ORIENTATION: LEFT

## 2022-06-01 ASSESSMENT — PAIN DESCRIPTION - PAIN TYPE: TYPE: ACUTE PAIN

## 2022-06-01 ASSESSMENT — PAIN - FUNCTIONAL ASSESSMENT
PAIN_FUNCTIONAL_ASSESSMENT: 0-10
PAIN_FUNCTIONAL_ASSESSMENT: ACTIVITIES ARE NOT PREVENTED

## 2022-06-01 ASSESSMENT — VISUAL ACUITY: OU: 1

## 2022-06-01 ASSESSMENT — PAIN DESCRIPTION - FREQUENCY: FREQUENCY: CONTINUOUS

## 2022-06-01 ASSESSMENT — PAIN DESCRIPTION - DESCRIPTORS: DESCRIPTORS: ACHING

## 2022-06-01 ASSESSMENT — PAIN DESCRIPTION - LOCATION: LOCATION: EYE

## 2022-06-01 ASSESSMENT — PAIN SCALES - GENERAL: PAINLEVEL_OUTOF10: 4

## 2022-06-01 NOTE — ED PROVIDER NOTES
[facility]  Urgent Care Encounter      CHIEF COMPLAINT       Chief Complaint   Patient presents with    Eye Drainage     left, red, swollen and itchy and painful       Nurses Notes reviewed and I agree except as noted in the HPI. HISTORY OFPRESENT ILLNESS   Sadie August is a 36 y.o. The history is provided by the patient. No  was used. Eye Problem  Location:  Left eye  Quality:  Foreign body sensation and tearing  Severity:  Severe  Onset quality:  Gradual  Duration:  1 week  Timing:  Constant  Progression:  Worsening (treated 5/27/22 no improvement)  Chronicity:  New  Context: not burn, not chemical exposure, not contact lens problem, not direct trauma, not foreign body, not using machinery, not scratch, not smoke exposure and not UV exposure    Relieved by:  Nothing  Worsened by:  Contact, coughing, exposure to smoke, eye movement, exposure to cold air, contact lenses and bright light  Ineffective treatments:  Eye drops  Associated symptoms: crusting, discharge, double vision, inflammation, itching and redness    Associated symptoms: no blurred vision, no decreased vision, no facial rash, no headaches, no nausea, no numbness, no photophobia, no scotomas, no swelling, no tearing, no tingling, no vomiting and no weakness    Risk factors: no conjunctival hemorrhage, no exposure to pinkeye, no previous injury to eye, no recent herpes zoster and no recent URI        REVIEW OF SYSTEMS     Review of Systems   Constitutional: Positive for fatigue. Negative for activity change, appetite change, chills, diaphoresis, fever and unexpected weight change. HENT: Positive for congestion, postnasal drip and rhinorrhea. Eyes: Positive for double vision, discharge, redness, itching and visual disturbance. Negative for blurred vision, photophobia and pain. Respiratory: Positive for cough. Negative for apnea, choking, chest tightness, shortness of breath, wheezing and stridor. Cardiovascular: Negative for chest pain, palpitations and leg swelling. Gastrointestinal: Negative for nausea and vomiting. Neurological: Negative for tingling, weakness, numbness and headaches. PAST MEDICAL HISTORY         Diagnosis Date    GERD (gastroesophageal reflux disease)     Vertigo        SURGICAL HISTORY     Patient  has a past surgical history that includes Tubal ligation. CURRENT MEDICATIONS       Discharge Medication List as of 6/1/2022 10:02 AM      CONTINUE these medications which have NOT CHANGED    Details   moxifloxacin (VIGAMOX) 0.5 % ophthalmic solution Place 1 drop into the left eye 3 times daily for 7 days, Disp-1 each, R-0Normal      albuterol sulfate  (90 Base) MCG/ACT inhaler Inhale 2 puffs into the lungs every 4 hours as needed for Wheezing or Shortness of Breath, Disp-18 g, R-0Normal      acetaminophen (TYLENOL) 500 MG tablet Take 1,000 mg by mouth every 6 hours as needed for Pain Historical Med      ibuprofen (ADVIL;MOTRIN) 400 MG tablet Take 1 tablet by mouth every 8 hours as needed for Pain, Disp-30 tablet, R-0Normal             ALLERGIES     Patient is has No Known Allergies. FAMILY HISTORY     Patient's family history includes Diabetes in her mother; Early Death (age of onset: 32) in her sister; Heart Disease in her mother; High Blood Pressure in her mother and sister; High Cholesterol in her mother; No Known Problems in her brother, brother, brother, and father; Stroke in her mother. SOCIAL HISTORY     Patient  reports that she has been smoking cigarettes. She has been smoking about 0.50 packs per day. She has never used smokeless tobacco. She reports that she does not drink alcohol and does not use drugs. PHYSICAL EXAM     ED TRIAGE VITALS  BP: 124/74, Temp: 97.8 °F (36.6 °C), Heart Rate: 92, Resp: 16, SpO2: 98 %  Physical Exam  Vitals and nursing note reviewed. Constitutional:       General: She is not in acute distress.      Appearance: Normal appearance. She is obese. She is not ill-appearing, toxic-appearing or diaphoretic. HENT:      Head: Normocephalic and atraumatic. Right Ear: External ear normal.      Left Ear: External ear normal.      Nose: Rhinorrhea present. No congestion. Mouth/Throat:      Mouth: Mucous membranes are moist.   Eyes:      General: Lids are everted, no foreign bodies appreciated. Vision grossly intact. Gaze aligned appropriately. Allergic shiner present. No visual field deficit or scleral icterus. Right eye: No discharge. Left eye: Discharge present. Extraocular Movements: Extraocular movements intact. Conjunctiva/sclera:      Right eye: Right conjunctiva is injected. Left eye: Left conjunctiva is injected. Pulmonary:      Effort: Pulmonary effort is normal.   Musculoskeletal:         General: Normal range of motion. Cervical back: Normal range of motion. Skin:     General: Skin is warm. Neurological:      General: No focal deficit present. Mental Status: She is alert and oriented to person, place, and time. Psychiatric:         Mood and Affect: Mood normal.         Behavior: Behavior normal.         Thought Content: Thought content normal.         Judgment: Judgment normal.         DIAGNOSTIC RESULTS   Labs:No results found for this visit on 06/01/22. IMAGING:  No orders to display     URGENT CARE COURSE:     Vitals:    06/01/22 0944   BP: 124/74   Pulse: 92   Resp: 16   Temp: 97.8 °F (36.6 °C)   TempSrc: Temporal   SpO2: 98%   Weight: 212 lb (96.2 kg)       Medications - No data to display  PROCEDURES:  None  FINAL IMPRESSION      1. Allergic conjunctivitis of left eye        DISPOSITION/PLAN   Decision To Discharge       I did discuss clinical findings with the patient as well as vital signs in assessment findings. He was advised that the Patient has signs and symptoms of seasonal allergies. Patient is afebrile and stable.  Patient can use Tylenol and/or OTC cough syrup. Avoid tobacco use/exposure,Take medication as directed,Drink Lots of fluids and Use Inhalers as directed if prescribed. Advised to follow up with family doctor in the next 2-3 days for reevaluation. The patient may return to urgent care if does not get better or symptoms worsen. However the patient is advised to go to ER immediately if present symptoms worsen, high fever >102 , vomiting, breathing difficulty, chest pain, lethargy or new symptoms develop. Patient/ parents understands this approach of home management and agrees to the treatment plan.       PATIENT REFERRED TO:  SONNY Vogel CNP  [address] [place] [address]  [phone]    Schedule an appointment as soon as possible for a visit       DISCHARGE MEDICATIONS:  Discharge Medication List as of 6/1/2022 10:02 AM      START taking these medications    Details   azelastine (OPTIVAR) 0.05 % ophthalmic solution Place 1 drop into the left eye 2 times daily, Disp-1 each, R-0Normal      predniSONE (DELTASONE) 20 MG tablet Take 1 tablet by mouth 2 times daily for 5 days, Disp-10 tablet, R-0Normal      diphenhydrAMINE (BENADRYL) 25 MG capsule Take 1 capsule by mouth nightly as needed for Itching, Allergies or Sleep, Disp-30 capsule, R-0Normal           Discharge Medication List as of 6/1/2022 10:02 AM          SONNY Mcgovern CNP, APRN - CNP  06/01/22 1012

## 2022-08-24 ENCOUNTER — HOSPITAL ENCOUNTER (EMERGENCY)
Age: 42
Discharge: HOME | End: 2022-08-24
Payer: COMMERCIAL

## 2022-08-24 VITALS
TEMPERATURE: 97.9 F | BODY MASS INDEX: 32.96 KG/M2 | WEIGHT: 210 LBS | SYSTOLIC BLOOD PRESSURE: 113 MMHG | HEIGHT: 67 IN | HEART RATE: 101 BPM | RESPIRATION RATE: 14 BRPM | DIASTOLIC BLOOD PRESSURE: 77 MMHG | OXYGEN SATURATION: 98 %

## 2022-08-24 DIAGNOSIS — Z11.52 ENCOUNTER FOR SCREENING FOR COVID-19: Primary | ICD-10-CM

## 2022-08-24 LAB
INFLUENZA A: NOT DETECTED
INFLUENZA B: NOT DETECTED
SARS-COV-2 RNA, RT PCR: NOT DETECTED

## 2022-08-24 PROCEDURE — 99213 OFFICE O/P EST LOW 20 MIN: CPT

## 2022-08-24 PROCEDURE — 87636 SARSCOV2 & INF A&B AMP PRB: CPT

## 2022-08-24 PROCEDURE — 99212 OFFICE O/P EST SF 10 MIN: CPT | Performed by: NURSE PRACTITIONER

## 2022-08-24 ASSESSMENT — PAIN - FUNCTIONAL ASSESSMENT: PAIN_FUNCTIONAL_ASSESSMENT: NONE - DENIES PAIN

## 2022-08-24 ASSESSMENT — ENCOUNTER SYMPTOMS
SINUS PRESSURE: 0
APNEA: 0
NAUSEA: 0
WHEEZING: 0
COUGH: 0
CHOKING: 0
RHINORRHEA: 0
CHEST TIGHTNESS: 0
SORE THROAT: 0
DIARRHEA: 0
CONSTIPATION: 0
SHORTNESS OF BREATH: 0
STRIDOR: 0
VOMITING: 0
ABDOMINAL PAIN: 0

## 2022-08-24 NOTE — ED PROVIDER NOTES
Heart Disease in her mother; High Blood Pressure in her mother and sister; High Cholesterol in her mother; No Known Problems in her brother, brother, brother, and father; Stroke in her mother. SOCIAL HISTORY     Patient  reports that she has been smoking cigarettes. She has been smoking an average of .5 packs per day. She has never used smokeless tobacco. She reports that she does not drink alcohol and does not use drugs. PHYSICAL EXAM     ED TRIAGE VITALS  BP: 113/77, Temp: 97.9 °F (36.6 °C), Heart Rate: (!) 101, Resp: 14, SpO2: 98 %  Physical Exam  Vitals and nursing note reviewed. Constitutional:       General: She is not in acute distress. Appearance: Normal appearance. She is obese. She is not ill-appearing, toxic-appearing or diaphoretic. HENT:      Head: Normocephalic and atraumatic. Right Ear: External ear normal.      Left Ear: External ear normal.   Eyes:      Extraocular Movements: Extraocular movements intact. Conjunctiva/sclera: Conjunctivae normal.   Pulmonary:      Effort: Pulmonary effort is normal. No respiratory distress. Breath sounds: Normal breath sounds. No stridor. No wheezing, rhonchi or rales. Chest:      Chest wall: No tenderness. Musculoskeletal:         General: Normal range of motion. Cervical back: Normal range of motion. Skin:     General: Skin is warm. Neurological:      General: No focal deficit present. Mental Status: She is alert and oriented to person, place, and time. Psychiatric:         Mood and Affect: Mood normal.         Behavior: Behavior normal.         Thought Content: Thought content normal.         Judgment: Judgment normal.       DIAGNOSTIC RESULTS   Labs:No results found for this visit on 08/24/22.     IMAGING:  No orders to display     URGENT CARE COURSE:     Vitals:    08/24/22 1017   BP: 113/77   Pulse: (!) 101   Resp: 14   Temp: 97.9 °F (36.6 °C)   SpO2: 98%   Weight: 210 lb (95.3 kg)   Height: 5' 7\" (1.702 m)

## 2022-08-24 NOTE — Clinical Note
Octavia Clark was seen and treated in our emergency department on 8/24/2022. She may return to work on 08/25/2022. Pending results of testing done at urgent care today     If you have any questions or concerns, please don't hesitate to call.       Bettye Barry, APRN - CNP

## 2023-09-14 ENCOUNTER — HOSPITAL ENCOUNTER (EMERGENCY)
Age: 43
Discharge: HOME | End: 2023-09-14
Payer: COMMERCIAL

## 2023-09-14 VITALS
HEART RATE: 87 BPM | SYSTOLIC BLOOD PRESSURE: 130 MMHG | HEIGHT: 68 IN | WEIGHT: 230 LBS | DIASTOLIC BLOOD PRESSURE: 75 MMHG | RESPIRATION RATE: 16 BRPM | OXYGEN SATURATION: 97 % | BODY MASS INDEX: 34.86 KG/M2 | TEMPERATURE: 98.1 F

## 2023-09-14 DIAGNOSIS — H60.393 INFECTIVE OTITIS EXTERNA OF BOTH EARS: Primary | ICD-10-CM

## 2023-09-14 DIAGNOSIS — L20.9 ATOPIC DERMATITIS, UNSPECIFIED TYPE: ICD-10-CM

## 2023-09-14 PROCEDURE — 99213 OFFICE O/P EST LOW 20 MIN: CPT

## 2023-09-14 RX ORDER — COLISTIN SULFATE, NEOMYCIN SULFATE, THONZONIUM BROMIDE AND HYDROCORTISONE ACETATE 3; 3.3; .5; 1 MG/ML; MG/ML; MG/ML; MG/ML
3 SUSPENSION AURICULAR (OTIC) 4 TIMES DAILY
Qty: 10 ML | Refills: 0 | Status: SHIPPED | OUTPATIENT
Start: 2023-09-14 | End: 2023-09-21

## 2023-09-14 RX ORDER — AMOXICILLIN AND CLAVULANATE POTASSIUM 875; 125 MG/1; MG/1
1 TABLET, FILM COATED ORAL 2 TIMES DAILY
Qty: 14 TABLET | Refills: 0 | Status: SHIPPED | OUTPATIENT
Start: 2023-09-14 | End: 2023-09-21

## 2023-09-14 ASSESSMENT — ENCOUNTER SYMPTOMS
SINUS PRESSURE: 0
COUGH: 0
DIARRHEA: 0
SHORTNESS OF BREATH: 0
NAUSEA: 0
VOMITING: 0
SORE THROAT: 0
ABDOMINAL PAIN: 0

## 2023-09-14 ASSESSMENT — PAIN DESCRIPTION - DESCRIPTORS: DESCRIPTORS: ACHING

## 2023-09-14 ASSESSMENT — PAIN DESCRIPTION - LOCATION: LOCATION: EAR

## 2023-09-14 ASSESSMENT — PAIN - FUNCTIONAL ASSESSMENT
PAIN_FUNCTIONAL_ASSESSMENT: PREVENTS OR INTERFERES SOME ACTIVE ACTIVITIES AND ADLS
PAIN_FUNCTIONAL_ASSESSMENT: 0-10

## 2023-09-14 ASSESSMENT — PAIN SCALES - GENERAL: PAINLEVEL_OUTOF10: 4

## 2023-09-14 ASSESSMENT — PAIN DESCRIPTION - ORIENTATION: ORIENTATION: RIGHT;LEFT

## 2023-09-14 ASSESSMENT — PAIN DESCRIPTION - PAIN TYPE: TYPE: ACUTE PAIN

## 2023-09-14 NOTE — ED PROVIDER NOTES
[address]       Chief Complaint   Patient presents with    Otalgia       Nurses Notes reviewed and I agree except as noted in the HPI. HISTORY OF PRESENT ILLNESS   Mallory Baron is a 39 y.o. female who presents to the urgent care for evaluation. She presents for evaluation of ear pain, started a couple of days ago. Skin behind the ears are leaking. The patient/patient representative has no other acute complaints at this time. REVIEW OF SYSTEMS     Review of Systems   Constitutional:  Negative for chills, fatigue and fever. HENT:  Positive for ear pain. Negative for congestion, sinus pressure and sore throat. Respiratory:  Negative for cough and shortness of breath. Cardiovascular:  Negative for chest pain. Gastrointestinal:  Negative for abdominal pain, diarrhea, nausea and vomiting. Skin:  Positive for wound. Negative for rash. Allergic/Immunologic: Negative for environmental allergies and food allergies. Neurological:  Negative for headaches. Hematological:  Negative for adenopathy. PAST MEDICAL HISTORY         Diagnosis Date    GERD (gastroesophageal reflux disease)     Vertigo        SURGICAL HISTORY     Patient  has a past surgical history that includes Tubal ligation. CURRENT MEDICATIONS       Discharge Medication List as of 9/14/2023  8:29 AM        CONTINUE these medications which have NOT CHANGED    Details   albuterol sulfate  (90 Base) MCG/ACT inhaler Inhale 2 puffs into the lungs every 4 hours as needed for Wheezing or Shortness of Breath, Disp-18 g, R-0Normal      acetaminophen (TYLENOL) 500 MG tablet Take 1,000 mg by mouth every 6 hours as needed for Pain Historical Med      ibuprofen (ADVIL;MOTRIN) 400 MG tablet Take 1 tablet by mouth every 8 hours as needed for Pain, Disp-30 tablet, R-0Normal             ALLERGIES     Patient is has No Known Allergies.     FAMILY HISTORY outpatient with PCP within the next 3 days, or sooner if symptoms warrant. PATIENT REFERRED TO:  Valentino Romberg, SONNY - CNP  [facility]  [phone]    Schedule an appointment as soon as possible for a visit in 3 days  For further evaluation. , If symptoms change/worsen, go to the [facility]. [address] [place] [address]  Schedule an appointment as soon as possible for a visit in 3 days  if you do not have a family provider, If symptoms change/worsen, go to the [address] [facility], SONNY - CNP    Please note that some or all of this chart was generated using [employer] voice recognition software. Although every effort was made to ensure the accuracy of this automated transcription, some errors in transcription may have occurred.          SONNY Hall CNP  09/14/23 7967

## 2023-11-01 ENCOUNTER — HOSPITAL ENCOUNTER (EMERGENCY)
Age: 43
Discharge: HOME | End: 2023-11-01
Payer: COMMERCIAL

## 2023-11-01 VITALS
DIASTOLIC BLOOD PRESSURE: 75 MMHG | BODY MASS INDEX: 32.74 KG/M2 | OXYGEN SATURATION: 97 % | HEIGHT: 68 IN | TEMPERATURE: 97 F | HEART RATE: 103 BPM | RESPIRATION RATE: 16 BRPM | WEIGHT: 216 LBS | SYSTOLIC BLOOD PRESSURE: 117 MMHG

## 2023-11-01 DIAGNOSIS — H60.543 ECZEMA OF EXTERNAL EAR, BILATERAL: Primary | ICD-10-CM

## 2023-11-01 DIAGNOSIS — H60.62 CHRONIC OTITIS EXTERNA OF LEFT EAR, UNSPECIFIED TYPE: ICD-10-CM

## 2023-11-01 PROCEDURE — 99213 OFFICE O/P EST LOW 20 MIN: CPT

## 2023-11-01 RX ORDER — MOMETASONE FUROATE 1 MG/G
CREAM TOPICAL
Qty: 45 G | Refills: 0 | Status: SHIPPED | OUTPATIENT
Start: 2023-11-01

## 2023-11-01 RX ORDER — GENTAMICIN SULFATE 3 MG/ML
SOLUTION/ DROPS OPHTHALMIC
Qty: 15 ML | Refills: 0 | Status: SHIPPED | OUTPATIENT
Start: 2023-11-01

## 2023-11-01 RX ORDER — DOXYCYCLINE HYCLATE 100 MG
100 TABLET ORAL 2 TIMES DAILY
Qty: 14 TABLET | Refills: 0 | Status: SHIPPED | OUTPATIENT
Start: 2023-11-01 | End: 2023-11-08

## 2023-11-01 ASSESSMENT — PAIN SCALES - GENERAL: PAINLEVEL_OUTOF10: 5

## 2023-11-01 ASSESSMENT — PAIN - FUNCTIONAL ASSESSMENT: PAIN_FUNCTIONAL_ASSESSMENT: 0-10

## 2023-11-01 ASSESSMENT — ENCOUNTER SYMPTOMS
SHORTNESS OF BREATH: 0
SORE THROAT: 0
SINUS PRESSURE: 0
COUGH: 0

## 2023-11-01 ASSESSMENT — PAIN DESCRIPTION - LOCATION: LOCATION: EAR

## 2023-11-01 ASSESSMENT — PAIN DESCRIPTION - ORIENTATION: ORIENTATION: LEFT

## 2023-11-01 NOTE — ED PROVIDER NOTES
[address]       Chief Complaint   Patient presents with    Otalgia     Left ear pain and drainage       Nurses Notes reviewed and I agree except as noted in the HPI. HISTORY OF PRESENT ILLNESS   Naina Lind is a 39 y.o. female who presents to the urgent care. The history is provided by the patient. Ear Problem  Location:  Left (with drainage)  Behind ear:  No abnormality  Quality:  Aching  Duration:  4 days (This is a re-current problem for many years, has been seen by ENT in the past.)  Relieved by:  None tried  Associated symptoms: ear discharge and rash    Associated symptoms: no congestion, no cough, no fever and no sore throat          The patient/patient representative has no other acute complaints at this time. REVIEW OF SYSTEMS     Review of Systems   Constitutional:  Negative for chills and fever. HENT:  Positive for ear discharge and ear pain. Negative for congestion, sinus pressure and sore throat. Respiratory:  Negative for cough and shortness of breath. Cardiovascular:  Negative for chest pain. Skin:  Positive for rash. Allergic/Immunologic: Negative for environmental allergies and food allergies. PAST MEDICAL HISTORY         Diagnosis Date    GERD (gastroesophageal reflux disease)     Vertigo        SURGICAL HISTORY     Patient  has a past surgical history that includes Tubal ligation.     CURRENT MEDICATIONS       Discharge Medication List as of 11/1/2023  9:20 AM        CONTINUE these medications which have NOT CHANGED    Details   albuterol sulfate  (90 Base) MCG/ACT inhaler Inhale 2 puffs into the lungs every 4 hours as needed for Wheezing or Shortness of Breath, Disp-18 g, R-0Normal      acetaminophen (TYLENOL) 500 MG tablet Take 1,000 mg by mouth every 6 hours as needed for Pain Historical Med      ibuprofen (ADVIL;MOTRIN) 400 MG tablet Take 1 tablet by mouth every 8 hours as needed for Pain,

## 2023-11-01 NOTE — ED TRIAGE NOTES
To room 1 c/o left ear pain and drainage  Pt reports this occurs frequently and she has seen ENT in past

## 2023-12-06 ENCOUNTER — HOSPITAL ENCOUNTER (EMERGENCY)
Age: 43
Discharge: HOME | End: 2023-12-06
Payer: COMMERCIAL

## 2023-12-06 VITALS
TEMPERATURE: 97 F | HEART RATE: 107 BPM | DIASTOLIC BLOOD PRESSURE: 71 MMHG | OXYGEN SATURATION: 100 % | RESPIRATION RATE: 20 BRPM | SYSTOLIC BLOOD PRESSURE: 144 MMHG

## 2023-12-06 DIAGNOSIS — J06.9 VIRAL URI WITH COUGH: Primary | ICD-10-CM

## 2023-12-06 LAB — SARS-COV-2 RDRP RESP QL NAA+PROBE: NOT  DETECTED

## 2023-12-06 PROCEDURE — 99213 OFFICE O/P EST LOW 20 MIN: CPT

## 2023-12-06 PROCEDURE — 87635 SARS-COV-2 COVID-19 AMP PRB: CPT

## 2023-12-06 RX ORDER — ACETAMINOPHEN AND CHLORPHENIRAMINE MALEATE 325; 2 MG/1; MG/1
2 TABLET, FILM COATED ORAL EVERY 4 HOURS PRN
Qty: 80 TABLET | Refills: 0 | Status: SHIPPED | OUTPATIENT
Start: 2023-12-06 | End: 2023-12-13

## 2023-12-06 RX ORDER — BENZONATATE 200 MG/1
200 CAPSULE ORAL 3 TIMES DAILY PRN
Qty: 30 CAPSULE | Refills: 0 | Status: SHIPPED | OUTPATIENT
Start: 2023-12-06 | End: 2023-12-16

## 2023-12-06 ASSESSMENT — ENCOUNTER SYMPTOMS
SINUS PRESSURE: 0
COUGH: 1
ABDOMINAL PAIN: 0
SINUS PAIN: 0
SORE THROAT: 0
CHEST TIGHTNESS: 0
WHEEZING: 0
SHORTNESS OF BREATH: 0
NAUSEA: 0
DIARRHEA: 0
VOMITING: 0

## 2024-01-10 ENCOUNTER — HOSPITAL ENCOUNTER (EMERGENCY)
Age: 44
Discharge: HOME | End: 2024-01-10
Payer: COMMERCIAL

## 2024-01-10 VITALS
RESPIRATION RATE: 16 BRPM | SYSTOLIC BLOOD PRESSURE: 126 MMHG | WEIGHT: 220 LBS | OXYGEN SATURATION: 99 % | HEART RATE: 97 BPM | HEIGHT: 68 IN | BODY MASS INDEX: 33.34 KG/M2 | TEMPERATURE: 97.1 F | DIASTOLIC BLOOD PRESSURE: 77 MMHG

## 2024-01-10 DIAGNOSIS — H60.392 INFECTIVE OTITIS EXTERNA OF LEFT EAR: Primary | ICD-10-CM

## 2024-01-10 PROCEDURE — 99213 OFFICE O/P EST LOW 20 MIN: CPT

## 2024-01-10 RX ORDER — AMOXICILLIN AND CLAVULANATE POTASSIUM 875; 125 MG/1; MG/1
1 TABLET, FILM COATED ORAL 2 TIMES DAILY
Qty: 20 TABLET | Refills: 0 | Status: SHIPPED | OUTPATIENT
Start: 2024-01-10 | End: 2024-01-20

## 2024-01-10 ASSESSMENT — ENCOUNTER SYMPTOMS
TROUBLE SWALLOWING: 0
COUGH: 0
RHINORRHEA: 0
EYE DISCHARGE: 0
VOMITING: 0
NAUSEA: 0
SHORTNESS OF BREATH: 0
DIARRHEA: 0
EYE REDNESS: 0
SORE THROAT: 0

## 2024-01-10 ASSESSMENT — LIFESTYLE VARIABLES
HOW OFTEN DO YOU HAVE A DRINK CONTAINING ALCOHOL: PATIENT DECLINED
HOW MANY STANDARD DRINKS CONTAINING ALCOHOL DO YOU HAVE ON A TYPICAL DAY: PATIENT DECLINED

## 2024-01-10 ASSESSMENT — PAIN SCALES - GENERAL: PAINLEVEL_OUTOF10: 5

## 2024-01-10 ASSESSMENT — PAIN - FUNCTIONAL ASSESSMENT: PAIN_FUNCTIONAL_ASSESSMENT: 0-10

## 2024-01-10 ASSESSMENT — PAIN DESCRIPTION - ORIENTATION: ORIENTATION: LEFT;RIGHT

## 2024-01-10 ASSESSMENT — PAIN DESCRIPTION - LOCATION: LOCATION: EAR

## 2024-01-10 NOTE — DISCHARGE INSTRUCTIONS
Prescribed Augmentin and Topical Valisone. Apply valisone to out ear twice daily. Recommend tylenol/ibuprofen as needed for pain control. Follow up with PCP if symptoms worsen or fail to improve in 3 to 5 days.

## 2024-01-10 NOTE — ED PROVIDER NOTES
[facility] URGENT CARE  Urgent Care Encounter      CHIEF COMPLAINT       Chief Complaint   Patient presents with    Otalgia     both       Nurses Notes reviewed and I agree except as noted in the HPI.  HISTORY OF PRESENT ILLNESS   Sadie Phillips is a 43 y.o. female who presents urgent care for evaluation of bilateral ear pain. Onset of symptoms 4 days ago. She report significant amount of ear drainage from left ear. Left ear very tender. Denies any URI symptoms, fever, body aches, chills.     REVIEW OF SYSTEMS     Review of Systems   Constitutional:  Negative for chills, diaphoresis, fatigue and fever.   HENT:  Positive for ear discharge and ear pain. Negative for congestion, rhinorrhea, sore throat and trouble swallowing.    Eyes:  Negative for discharge and redness.   Respiratory:  Negative for cough and shortness of breath.    Cardiovascular:  Negative for chest pain.   Gastrointestinal:  Negative for diarrhea, nausea and vomiting.   Genitourinary:  Negative for decreased urine volume.   Musculoskeletal:  Negative for neck pain and neck stiffness.   Skin:  Negative for rash.   Neurological:  Negative for headaches.   Hematological:  Negative for adenopathy.   Psychiatric/Behavioral:  Negative for sleep disturbance.        PAST MEDICAL HISTORY         Diagnosis Date    GERD (gastroesophageal reflux disease)     Vertigo        SURGICAL HISTORY     Patient  has a past surgical history that includes Tubal ligation.    CURRENT MEDICATIONS       Previous Medications    ACETAMINOPHEN (TYLENOL) 500 MG TABLET    Take 1,000 mg by mouth every 6 hours as needed for Pain     ALBUTEROL SULFATE  (90 BASE) MCG/ACT INHALER    Inhale 2 puffs into the lungs every 4 hours as needed for Wheezing or Shortness of Breath    GENTAMICIN (GARAMYCIN) 0.3 % OPHTHALMIC SOLUTION    Apply 4 drops into left ear 2 times daily for 7 days.    IBUPROFEN (ADVIL;MOTRIN) 400 MG TABLET    Take 1 tablet by mouth every 8 hours as

## 2024-01-10 NOTE — ED NOTES
Pt presents to [facility] for c/o bilateral ear pain and drainage x 3 days     Alton Moran, RONNIE  01/10/24 1042

## 2024-05-01 ENCOUNTER — HOSPITAL ENCOUNTER (EMERGENCY)
Age: 44
Discharge: HOME | End: 2024-05-01
Payer: COMMERCIAL

## 2024-05-01 VITALS
OXYGEN SATURATION: 100 % | TEMPERATURE: 97.6 F | HEART RATE: 100 BPM | DIASTOLIC BLOOD PRESSURE: 83 MMHG | SYSTOLIC BLOOD PRESSURE: 134 MMHG | RESPIRATION RATE: 20 BRPM

## 2024-05-01 DIAGNOSIS — H66.43: Primary | ICD-10-CM

## 2024-05-01 DIAGNOSIS — H72.93: Primary | ICD-10-CM

## 2024-05-01 PROCEDURE — 99213 OFFICE O/P EST LOW 20 MIN: CPT | Performed by: EMERGENCY MEDICINE

## 2024-05-01 PROCEDURE — 99213 OFFICE O/P EST LOW 20 MIN: CPT

## 2024-05-01 RX ORDER — AMOXICILLIN AND CLAVULANATE POTASSIUM 875; 125 MG/1; MG/1
1 TABLET, FILM COATED ORAL 2 TIMES DAILY
Qty: 14 TABLET | Refills: 0 | Status: SHIPPED | OUTPATIENT
Start: 2024-05-01 | End: 2024-05-08

## 2024-05-01 ASSESSMENT — PAIN SCALES - GENERAL: PAINLEVEL_OUTOF10: 4

## 2024-05-01 ASSESSMENT — ENCOUNTER SYMPTOMS
RHINORRHEA: 1
SINUS PAIN: 0
SHORTNESS OF BREATH: 0
SINUS PRESSURE: 1
COUGH: 0

## 2024-05-01 NOTE — DISCHARGE INSTRUCTIONS
Augmentin as prescribed until gone    Tylenol as needed for pain    Over-the-counter antihistamine/decongestants to help with sinus symptoms.  This may also help with your ear pain    Make an appointment with your family physician to have ears reevaluated after completing the antibiotic to assure complete resolution and to evaluate for possible eardrum rupture

## 2024-05-01 NOTE — ED PROVIDER NOTES
[facility] URGENT CARE  Urgent Care Encounter       CHIEF COMPLAINT       Chief Complaint   Patient presents with    Otalgia       Nurses Notes reviewed and I agree except as noted in the HPI.  HISTORY OF PRESENT ILLNESS   Sadie Phillips is a 44 y.o. female who presents for complaints of bilateral ear pain.  Left greater than right.  Symptoms have been present for 2 to 3 days.  She has been significantly congested for several days prior to the onset of the ear pain.  She has had drainage out of both the ears, left greater than right.  Denies fever.  Hearing is muffled.    HPI    REVIEW OF SYSTEMS     Review of Systems   Constitutional:  Negative for activity change, fatigue and fever.   HENT:  Positive for congestion, ear discharge, ear pain, rhinorrhea and sinus pressure. Negative for sinus pain.    Respiratory:  Negative for cough and shortness of breath.    Neurological:  Negative for dizziness and headaches.       PAST MEDICAL HISTORY         Diagnosis Date    GERD (gastroesophageal reflux disease)     Vertigo        SURGICALHISTORY     Patient  has a past surgical history that includes Tubal ligation.    CURRENT MEDICATIONS       Discharge Medication List as of 5/1/2024  9:23 AM        CONTINUE these medications which have NOT CHANGED    Details   betamethasone valerate (VALISONE) 0.1 % ointment Apply topically 2 times daily., Disp-15 g, R-0, Normal      gentamicin (GARAMYCIN) 0.3 % ophthalmic solution Apply 4 drops into left ear 2 times daily for 7 days., Disp-15 mL, R-0Normal      mometasone (ELOCON) 0.1 % cream Apply to external ears twice daily., Disp-45 g, R-0, Normal      albuterol sulfate  (90 Base) MCG/ACT inhaler Inhale 2 puffs into the lungs every 4 hours as needed for Wheezing or Shortness of Breath, Disp-18 g, R-0Normal      acetaminophen (TYLENOL) 500 MG tablet Take 1,000 mg by mouth every 6 hours as needed for Pain Historical Med      ibuprofen (ADVIL;MOTRIN) 400 MG tablet

## 2025-08-21 ENCOUNTER — HOSPITAL ENCOUNTER (EMERGENCY)
Age: 45
Discharge: HOME | End: 2025-08-21
Payer: COMMERCIAL

## 2025-08-21 ENCOUNTER — APPOINTMENT (OUTPATIENT)
Dept: GENERAL RADIOLOGY | Age: 45
End: 2025-08-21
Payer: COMMERCIAL

## 2025-08-21 VITALS
BODY MASS INDEX: 34.86 KG/M2 | HEART RATE: 84 BPM | DIASTOLIC BLOOD PRESSURE: 89 MMHG | HEIGHT: 68 IN | WEIGHT: 230 LBS | RESPIRATION RATE: 14 BRPM | TEMPERATURE: 98.1 F | SYSTOLIC BLOOD PRESSURE: 138 MMHG | OXYGEN SATURATION: 98 %

## 2025-08-21 DIAGNOSIS — S40.011A CONTUSION OF RIGHT SHOULDER, INITIAL ENCOUNTER: Primary | ICD-10-CM

## 2025-08-21 DIAGNOSIS — S46.911A RIGHT SHOULDER STRAIN, INITIAL ENCOUNTER: ICD-10-CM

## 2025-08-21 PROCEDURE — 73030 X-RAY EXAM OF SHOULDER: CPT

## 2025-08-21 PROCEDURE — 99213 OFFICE O/P EST LOW 20 MIN: CPT

## 2025-08-21 PROCEDURE — 99214 OFFICE O/P EST MOD 30 MIN: CPT

## 2025-08-21 RX ORDER — FERROUS GLUCONATE 324(38)MG
324 TABLET ORAL
COMMUNITY

## 2025-08-21 ASSESSMENT — PAIN DESCRIPTION - ORIENTATION: ORIENTATION: RIGHT

## 2025-08-21 ASSESSMENT — PAIN - FUNCTIONAL ASSESSMENT: PAIN_FUNCTIONAL_ASSESSMENT: 0-10

## 2025-08-21 ASSESSMENT — PAIN DESCRIPTION - LOCATION: LOCATION: SHOULDER

## 2025-08-21 ASSESSMENT — ENCOUNTER SYMPTOMS
VOMITING: 0
NAUSEA: 1
SHORTNESS OF BREATH: 0

## 2025-08-21 ASSESSMENT — PAIN SCALES - GENERAL: PAINLEVEL_OUTOF10: 8
